# Patient Record
Sex: FEMALE | Race: WHITE | Employment: UNEMPLOYED | URBAN - METROPOLITAN AREA
[De-identification: names, ages, dates, MRNs, and addresses within clinical notes are randomized per-mention and may not be internally consistent; named-entity substitution may affect disease eponyms.]

---

## 2017-04-10 ENCOUNTER — APPOINTMENT (OUTPATIENT)
Dept: GENERAL RADIOLOGY | Facility: CLINIC | Age: 57
End: 2017-04-10
Payer: COMMERCIAL

## 2017-04-10 ENCOUNTER — HOSPITAL ENCOUNTER (EMERGENCY)
Facility: CLINIC | Age: 57
Discharge: HOME | End: 2017-04-10
Attending: EMERGENCY MEDICINE
Payer: COMMERCIAL

## 2017-04-10 VITALS
RESPIRATION RATE: 18 BRPM | SYSTOLIC BLOOD PRESSURE: 158 MMHG | HEART RATE: 85 BPM | OXYGEN SATURATION: 98 % | HEIGHT: 64 IN | TEMPERATURE: 98.1 F | DIASTOLIC BLOOD PRESSURE: 108 MMHG | BODY MASS INDEX: 23.22 KG/M2 | WEIGHT: 136 LBS

## 2017-04-10 DIAGNOSIS — R53.83 OTHER FATIGUE: ICD-10-CM

## 2017-04-10 DIAGNOSIS — R05.9 COUGH: Primary | ICD-10-CM

## 2017-04-10 LAB
ABSOLUTE EOS #: 0.1 K/UL (ref 0–0.4)
ABSOLUTE LYMPH #: 1.1 K/UL (ref 1–4.8)
ABSOLUTE MONO #: 0.5 K/UL (ref 0.1–1.2)
ALBUMIN SERPL-MCNC: 3.9 G/DL (ref 3.5–5.2)
ALBUMIN/GLOBULIN RATIO: 1.3 (ref 1–2.5)
ALP BLD-CCNC: 79 U/L (ref 35–104)
ALT SERPL-CCNC: 24 U/L (ref 5–33)
ANION GAP SERPL CALCULATED.3IONS-SCNC: 14 MMOL/L (ref 9–17)
AST SERPL-CCNC: 36 U/L
BASOPHILS # BLD: 0 % (ref 0–2)
BASOPHILS ABSOLUTE: 0 K/UL (ref 0–0.2)
BILIRUB SERPL-MCNC: 0.3 MG/DL (ref 0.3–1.2)
BUN BLDV-MCNC: 11 MG/DL (ref 6–20)
BUN/CREAT BLD: ABNORMAL (ref 9–20)
CALCIUM SERPL-MCNC: 9.4 MG/DL (ref 8.6–10.4)
CHLORIDE BLD-SCNC: 103 MMOL/L (ref 98–107)
CO2: 23 MMOL/L (ref 20–31)
CREAT SERPL-MCNC: 0.5 MG/DL (ref 0.5–0.9)
D-DIMER QUANTITATIVE: 0.44 MG/L FEU
DIFFERENTIAL TYPE: ABNORMAL
EOSINOPHILS RELATIVE PERCENT: 2 % (ref 1–4)
GFR AFRICAN AMERICAN: >60 ML/MIN
GFR NON-AFRICAN AMERICAN: >60 ML/MIN
GFR SERPL CREATININE-BSD FRML MDRD: ABNORMAL ML/MIN/{1.73_M2}
GFR SERPL CREATININE-BSD FRML MDRD: ABNORMAL ML/MIN/{1.73_M2}
GLUCOSE BLD-MCNC: 164 MG/DL (ref 70–99)
HCT VFR BLD CALC: 41.5 % (ref 36–46)
HEMOGLOBIN: 14.2 G/DL (ref 12–16)
LYMPHOCYTES # BLD: 23 % (ref 24–44)
MCH RBC QN AUTO: 34.5 PG (ref 26–34)
MCHC RBC AUTO-ENTMCNC: 34.2 G/DL (ref 31–37)
MCV RBC AUTO: 100.9 FL (ref 80–100)
MONOCYTES # BLD: 10 % (ref 2–11)
PDW BLD-RTO: 12.1 % (ref 12.5–15.4)
PLATELET # BLD: 273 K/UL (ref 140–450)
PLATELET ESTIMATE: ABNORMAL
PMV BLD AUTO: 7.8 FL (ref 6–12)
POTASSIUM SERPL-SCNC: 3.9 MMOL/L (ref 3.7–5.3)
RBC # BLD: 4.11 M/UL (ref 4–5.2)
RBC # BLD: ABNORMAL 10*6/UL
SEG NEUTROPHILS: 65 % (ref 36–66)
SEGMENTED NEUTROPHILS ABSOLUTE COUNT: 3.2 K/UL (ref 1.8–7.7)
SODIUM BLD-SCNC: 140 MMOL/L (ref 135–144)
TOTAL PROTEIN: 6.9 G/DL (ref 6.4–8.3)
TROPONIN INTERP: NORMAL
TROPONIN T: <0.03 NG/ML
TSH SERPL DL<=0.05 MIU/L-ACNC: 1.37 MIU/L (ref 0.3–5)
WBC # BLD: 4.9 K/UL (ref 3.5–11)
WBC # BLD: ABNORMAL 10*3/UL

## 2017-04-10 PROCEDURE — 36415 COLL VENOUS BLD VENIPUNCTURE: CPT

## 2017-04-10 PROCEDURE — 93005 ELECTROCARDIOGRAM TRACING: CPT

## 2017-04-10 PROCEDURE — 85379 FIBRIN DEGRADATION QUANT: CPT

## 2017-04-10 PROCEDURE — 84443 ASSAY THYROID STIM HORMONE: CPT

## 2017-04-10 PROCEDURE — 85025 COMPLETE CBC W/AUTO DIFF WBC: CPT

## 2017-04-10 PROCEDURE — 71020 XR CHEST STANDARD TWO VW: CPT

## 2017-04-10 PROCEDURE — 80053 COMPREHEN METABOLIC PANEL: CPT

## 2017-04-10 PROCEDURE — 99284 EMERGENCY DEPT VISIT MOD MDM: CPT

## 2017-04-10 PROCEDURE — 84484 ASSAY OF TROPONIN QUANT: CPT

## 2017-04-10 RX ORDER — BENZONATATE 200 MG/1
200 CAPSULE ORAL 3 TIMES DAILY PRN
Qty: 30 CAPSULE | Refills: 0 | Status: SHIPPED | OUTPATIENT
Start: 2017-04-10 | End: 2017-04-17

## 2017-04-10 ASSESSMENT — ENCOUNTER SYMPTOMS
BLOOD IN STOOL: 0
COUGH: 1
ABDOMINAL PAIN: 0
EYE PAIN: 0
SHORTNESS OF BREATH: 0
WHEEZING: 1
BACK PAIN: 0
NAUSEA: 0
DIARRHEA: 0
CONSTIPATION: 0
VOMITING: 0

## 2017-04-11 LAB
EKG ATRIAL RATE: 80 BPM
EKG P AXIS: 62 DEGREES
EKG P-R INTERVAL: 114 MS
EKG Q-T INTERVAL: 390 MS
EKG QRS DURATION: 88 MS
EKG QTC CALCULATION (BAZETT): 449 MS
EKG R AXIS: 57 DEGREES
EKG T AXIS: 25 DEGREES
EKG VENTRICULAR RATE: 80 BPM

## 2017-06-20 ENCOUNTER — HOSPITAL ENCOUNTER (OUTPATIENT)
Dept: MAMMOGRAPHY | Age: 57
Discharge: HOME | End: 2017-06-20
Payer: COMMERCIAL

## 2017-06-20 DIAGNOSIS — Z12.31 VISIT FOR SCREENING MAMMOGRAM: ICD-10-CM

## 2017-06-20 PROCEDURE — 77063 BREAST TOMOSYNTHESIS BI: CPT

## 2018-01-24 ENCOUNTER — HOSPITAL ENCOUNTER (OUTPATIENT)
Age: 58
Setting detail: SPECIMEN
Discharge: HOME | End: 2018-01-24
Payer: COMMERCIAL

## 2018-01-24 ENCOUNTER — HOSPITAL ENCOUNTER (OUTPATIENT)
Age: 58
Discharge: HOME | End: 2018-01-24
Payer: COMMERCIAL

## 2018-01-24 PROCEDURE — 88305 TISSUE EXAM BY PATHOLOGIST: CPT

## 2018-02-28 LAB
SURGICAL PATHOLOGY REPORT: NORMAL
SURGICAL PATHOLOGY REPORT: NORMAL

## 2018-07-09 ENCOUNTER — HOSPITAL ENCOUNTER (OUTPATIENT)
Dept: MAMMOGRAPHY | Age: 58
Discharge: HOME | End: 2018-07-11
Payer: COMMERCIAL

## 2018-07-09 DIAGNOSIS — Z12.31 ENCOUNTER FOR SCREENING MAMMOGRAM FOR BREAST CANCER: ICD-10-CM

## 2018-07-09 PROCEDURE — 77063 BREAST TOMOSYNTHESIS BI: CPT

## 2019-07-23 ENCOUNTER — HOSPITAL ENCOUNTER (OUTPATIENT)
Dept: MAMMOGRAPHY | Age: 59
Discharge: HOME | End: 2019-07-25
Payer: COMMERCIAL

## 2019-07-23 DIAGNOSIS — Z12.39 SCREENING FOR BREAST CANCER: ICD-10-CM

## 2019-07-23 PROCEDURE — 77063 BREAST TOMOSYNTHESIS BI: CPT

## 2019-08-21 ENCOUNTER — TELEPHONE (OUTPATIENT)
Dept: ONCOLOGY | Age: 59
End: 2019-08-21

## 2019-08-28 ENCOUNTER — HOSPITAL ENCOUNTER (EMERGENCY)
Facility: CLINIC | Age: 59
Discharge: HOME | End: 2019-08-28
Attending: EMERGENCY MEDICINE
Payer: COMMERCIAL

## 2019-08-28 VITALS
HEIGHT: 64 IN | RESPIRATION RATE: 16 BRPM | OXYGEN SATURATION: 96 % | SYSTOLIC BLOOD PRESSURE: 150 MMHG | BODY MASS INDEX: 24.75 KG/M2 | WEIGHT: 145 LBS | TEMPERATURE: 98.3 F | DIASTOLIC BLOOD PRESSURE: 93 MMHG | HEART RATE: 86 BPM

## 2019-08-28 DIAGNOSIS — T63.481A INSECT STINGS, ACCIDENTAL OR UNINTENTIONAL, INITIAL ENCOUNTER: Primary | ICD-10-CM

## 2019-08-28 PROCEDURE — 99281 EMR DPT VST MAYX REQ PHY/QHP: CPT

## 2019-08-28 RX ORDER — PREDNISONE 10 MG/1
TABLET ORAL
Qty: 20 TABLET | Refills: 0 | Status: SHIPPED | OUTPATIENT
Start: 2019-08-28 | End: 2019-09-07

## 2019-08-28 RX ORDER — DIPHENHYDRAMINE HCL 25 MG
50 CAPSULE ORAL EVERY 6 HOURS PRN
Qty: 30 CAPSULE | Refills: 0 | Status: SHIPPED | OUTPATIENT
Start: 2019-08-28 | End: 2022-04-06

## 2019-08-28 ASSESSMENT — PAIN DESCRIPTION - PAIN TYPE: TYPE: ACUTE PAIN

## 2019-08-28 ASSESSMENT — PAIN DESCRIPTION - PROGRESSION: CLINICAL_PROGRESSION: GRADUALLY WORSENING

## 2019-08-28 ASSESSMENT — PAIN DESCRIPTION - LOCATION: LOCATION: LEG

## 2019-08-28 ASSESSMENT — PAIN DESCRIPTION - ONSET: ONSET: SUDDEN

## 2019-08-28 ASSESSMENT — PAIN SCALES - GENERAL: PAINLEVEL_OUTOF10: 5

## 2019-08-28 ASSESSMENT — PAIN DESCRIPTION - DESCRIPTORS: DESCRIPTORS: ACHING

## 2019-08-28 ASSESSMENT — PAIN DESCRIPTION - ORIENTATION: ORIENTATION: LEFT;UPPER

## 2019-08-28 ASSESSMENT — PAIN DESCRIPTION - FREQUENCY: FREQUENCY: INTERMITTENT

## 2019-08-28 NOTE — ED PROVIDER NOTES
[facility]  eMERGENCY dEPARTMENT eNCOUnter      Pt Name: Yong Thomas  MRN: [id number]  Geremiasgfsabrina 1960  Date of evaluation: 8/28/2019      CHIEF COMPLAINT       Chief Complaint   Patient presents with    Insect Bite     stung last night by wasp, localized edema and and redness to L upper leg, denies any trouble breathing or swallowing, no at home Rx         HISTORY OF PRESENT ILLNESS      The patient presents with a insect sting on her left thigh. She was stung last night on the proximal thigh. She now has redness and warmth over the whole top of the thigh. The patient has no hives or difficulty breathing. There is no rash elsewhere. It is rather itchy. She tried some ibuprofen but nothing else for it. Nothing makes her symptoms better or worse otherwise. REVIEW OF SYSTEMS       All systems reviewed and negative unless noted in HPI. The patient denies fever or constitutional symptoms. Denies vision change. Denies any sore throat or rhinorrhea. Denies any neck pain or stiffness. Denies chest pain or shortness of breath. No nausea,  vomiting or diarrhea. Denies any dysuria. Denies urinary frequency or hematuria. Denies musculoskeletal injury or pain. Denies any weakness, numbness or focal neurologic deficit. Left thigh redness and warmth after sting. No recent psychiatric issues. No easy bruising or bleeding. Denies any polyuria, polydypsia or history of immunocompromise. PAST MEDICAL HISTORY    has a past medical history of Depression and History of abnormal Pap smear. SURGICAL HISTORY      has a past surgical history that includes Colposcopy (2005) and Hand surgery (2013). CURRENT MEDICATIONS       Previous Medications    NORETHINDRONE (FERNANDA-BE) 0.35 MG TABLET    Take 1 tablet by mouth daily. SERTRALINE (ZOLOFT) 100 MG TABLET    TAKE ONE TABLET BY MOUTH ONE TIME DAILY       ALLERGIES     has No Known Allergies.     FAMILY Localized reaction to hymenoptera sting, allergic reaction    DIAGNOSTIC RESULTS         EMERGENCY DEPARTMENT COURSE:   Vitals:    Vitals:    08/28/19 1551   BP: (!) 150/93   Pulse: 86   Resp: 16   Temp: 98.3 °F (36.8 °C)   TempSrc: Oral   SpO2: 96%   Weight: 65.8 kg (145 lb)   Height: 5' 4\" (1.626 m)     -------------------------  BP: (!) 150/93, Temp: 98.3 °F (36.8 °C), Pulse: 86, Resp: 16      Re-evaluation Notes    I think this is a localized reaction. I will write for any histamines and steroids. The patient is discharged in good condition. She knows to return for difficulty breathing or hives. FINAL IMPRESSION      1.  Insect stings, accidental or unintentional, initial encounter          DISPOSITION/PLAN   DISPOSITION Decision To Discharge 08/28/2019 04:30:23 PM      Condition on Disposition    good    PATIENT REFERRED TO:  Merced Andrew MD  [address] [place] [address]  [phone]    In 1 week        DISCHARGE MEDICATIONS:  New Prescriptions    DIPHENHYDRAMINE (BENADRYL) 25 MG CAPSULE    Take 2 capsules by mouth every 6 hours as needed for Itching    PREDNISONE (DELTASONE) 10 MG TABLET    Take 4 tablets by mouth once daily for 5 days       (Please note that portions of this note were completed with a voice recognition program.  Efforts were made to edit the dictations but occasionally words are mis-transcribed.)    Saucedo MD   Attending Emergency Physician       Kevin Rubalcava MD  08/28/19 [id number]

## 2020-09-08 ENCOUNTER — HOSPITAL ENCOUNTER (OUTPATIENT)
Dept: MAMMOGRAPHY | Age: 60
Discharge: HOME | End: 2020-09-10
Payer: COMMERCIAL

## 2020-09-08 PROCEDURE — 77063 BREAST TOMOSYNTHESIS BI: CPT

## 2021-09-14 ENCOUNTER — HOSPITAL ENCOUNTER (OUTPATIENT)
Dept: MAMMOGRAPHY | Age: 61
Discharge: HOME | End: 2021-09-16
Payer: COMMERCIAL

## 2021-09-14 DIAGNOSIS — Z12.31 ENCOUNTER FOR SCREENING MAMMOGRAM FOR BREAST CANCER: ICD-10-CM

## 2021-09-14 PROCEDURE — 77063 BREAST TOMOSYNTHESIS BI: CPT

## 2022-04-05 ASSESSMENT — ENCOUNTER SYMPTOMS
COLOR CHANGE: 0
DIARRHEA: 0
SINUS PRESSURE: 0
CONSTIPATION: 0
CHEST TIGHTNESS: 0
ABDOMINAL PAIN: 0
SHORTNESS OF BREATH: 0
SINUS PAIN: 0

## 2022-04-05 NOTE — PROGRESS NOTES
Chelly Amin is a 62 y.o. female who presents in office today with Self establish new care with office. Previous PCP was Dr Travis Cervantes at [facility]   Patient presents with   Ai Gupta New Patient     establish care        History of Present Illness:     HPI    Here to establish care. Overall feeling well. Doing well on current medications. Quit smoking in 2017 after having pneumonia. Was informed previously of diagnosis of COPD however unclear if she actually has this. Suspect she had COVID in 2022 while in [place]. She was unable to obtain Covid test until 8 days later so was not tested. She does have albuterol as needed and she has been using once every few days for coughing. Reports she had spirometry and lung CT . Carpal tunnel in both hands. Going for first cortisone injection tomorrow with Dr. Norma Cuello at [facility]. Had EMG done at Dr Raciel Varela, neurology, confirming carpal tunnel. Reports has had multiple liver US showing fatty liver. Last scan about 6 months ago. Had elevated liver enzymes which she relates to drinking. She does admit to drinking vodka most days. Had blood work 2021. Fibroscan in  showing moderate fibrosis. She has appointment scheduled with Dr. Oneil Saxena at [facility] at the end of May. She plans to address questions about her liver during this appointment. Depression controlled on venlafaxine 37.5 mg daily.        Care gaps: COVID-19 vaccine:   J&J x1, 2021  Colon CA screenin, repeat in 5 years, Dr Merlin Ruiz:   Hepatitis C/HIV screens:     Breast CA screenin/14/21 negative, family hx breast CA and increased lifetime risk  OB/GYN, cervical CA screening:   Due   Low dose CT:   [facility] Maintenance Due   Topic Date Due    Hepatitis C screen  Never done    COVID-19 Vaccine (1) Never done    Depression Screen  Never done    HIV screen  Never done    DTaP/Tdap/Td vaccine (1 - Tdap) Never done    Lipid screen  Never done    Colorectal Cancer Screen  Never done    Shingles Vaccine (1 of 2) Never done    Low dose CT lung screening  Never done    Cervical cancer screen  04/29/2017        Patient Care Team:  STEPHY Gomez - CNP as PCP - General (Nurse Practitioner)    Reviewed     [x] Past Medical, Family, and Social History was reviewed per writer and does contribute to the patient presenting condition. [x] Laboratory Results, Vital signs, Imaging, Active Problems, Immunizations, Current/Recently Discontinued Medications, Health Maintenance Activities Due, Referral Notes (if available) were reviewed per writer     [x] Reviewed Depression screening if taken or valid today or any other valid screening tool (others seen below) Interpretation of Total Score DepressionSeverity: 1-4 = Minimal depression, 5-9 = Mild depression, 10-14 = Moderate depression, 15-19 = Moderately severe depression, 20-27 =Severe depression    PHQ Scores 4/6/2022   PHQ2 Score 4   PHQ9 Score 9     Interpretation of Total Score Depression Severity: 1-4 = Minimal depression, 5-9 = Mild depression, 10-14 = Moderate depression, 15-19 = Moderately severe depression, 20-27 = Severe depression     Review of Systems (Subjective)     Review of Systems   Constitutional: Negative for activity change, appetite change and unexpected weight change. HENT: Negative for congestion, sinus pressure and sinus pain. Respiratory: Negative for chest tightness and shortness of breath. Cardiovascular: Negative for chest pain, palpitations and leg swelling. Gastrointestinal: Negative for abdominal pain, constipation and diarrhea. Genitourinary: Negative for difficulty urinating and dysuria. Musculoskeletal: Positive for arthralgias (bilateral wrists). Negative for myalgias. Skin: Negative for color change and rash. Neurological: Negative for dizziness, numbness and headaches.    Psychiatric/Behavioral: Negative for dysphoric mood and sleep disturbance. The patient is not nervous/anxious. See HPI     Physical Assessment (Objective)     /85 (Site: Left Upper Arm, Position: Sitting, Cuff Size: Small Adult)   Pulse 101   Temp 97.2 °F (36.2 °C) (Temporal)   Resp 16   Ht 5' 4\" (1.626 m)   Wt 143 lb (64.9 kg)   SpO2 95%   BMI 24.55 kg/m²      Physical Exam  Vitals reviewed. HENT:      Head: Normocephalic and atraumatic. Right Ear: External ear normal.      Left Ear: External ear normal.      Nose: Nose normal.      Mouth/Throat:      Mouth: Mucous membranes are moist.      Pharynx: Oropharynx is clear. Eyes:      Extraocular Movements: Extraocular movements intact. Conjunctiva/sclera: Conjunctivae normal.      Pupils: Pupils are equal, round, and reactive to light. Cardiovascular:      Rate and Rhythm: Normal rate and regular rhythm. Pulses: Normal pulses. Heart sounds: Normal heart sounds. No murmur heard. Pulmonary:      Effort: Pulmonary effort is normal. No respiratory distress. Breath sounds: Normal breath sounds. Abdominal:      General: Bowel sounds are normal.      Palpations: Abdomen is soft. Musculoskeletal:         General: No swelling. Normal range of motion. Cervical back: Normal range of motion and neck supple. Skin:     General: Skin is warm and dry. Capillary Refill: Capillary refill takes less than 2 seconds. Neurological:      Mental Status: She is alert and oriented to person, place, and time. Motor: No weakness. Gait: Gait normal.   Psychiatric:         Mood and Affect: Mood normal.         Behavior: Behavior normal.         Thought Content: Thought content normal.         Judgment: Judgment normal.         Diagnoses / Plan:   1. Encounter to establish care  2. Primary hypertension  3. Other hyperlipidemia     Follow up with specialist as scheduled. Obtain labs and scans from [facility].    Declines need for refills on medications at this time. Understanding and agreement was voiced with all above plans. All questions answered to satisfaction. Encouraged healthy diet and exercise. Call office with any questions or new or worsening symptoms or concerns. Return in about 3 months (around 7/6/2022) for chronic conditions, discuss labs. Electronically signed by STEPHY Barksdale CNP on 4/6/2022 at 4:37 PM    Note is dictated utilizing voice recognition software. Unfortunately this leads to occasional typographical errors. Please contact our office if you have any questions.

## 2022-04-06 ENCOUNTER — OFFICE VISIT (OUTPATIENT)
Dept: FAMILY MEDICINE CLINIC | Age: 62
End: 2022-04-06
Payer: COMMERCIAL

## 2022-04-06 VITALS
HEART RATE: 101 BPM | RESPIRATION RATE: 16 BRPM | BODY MASS INDEX: 24.41 KG/M2 | WEIGHT: 143 LBS | HEIGHT: 64 IN | SYSTOLIC BLOOD PRESSURE: 124 MMHG | TEMPERATURE: 97.2 F | OXYGEN SATURATION: 95 % | DIASTOLIC BLOOD PRESSURE: 85 MMHG

## 2022-04-06 DIAGNOSIS — I10 PRIMARY HYPERTENSION: ICD-10-CM

## 2022-04-06 DIAGNOSIS — Z76.89 ENCOUNTER TO ESTABLISH CARE: Primary | ICD-10-CM

## 2022-04-06 DIAGNOSIS — E78.49 OTHER HYPERLIPIDEMIA: ICD-10-CM

## 2022-04-06 PROBLEM — K76.0 FATTY LIVER: Status: ACTIVE | Noted: 2022-04-06

## 2022-04-06 PROCEDURE — 99203 OFFICE O/P NEW LOW 30 MIN: CPT | Performed by: NURSE PRACTITIONER

## 2022-04-06 RX ORDER — VENLAFAXINE 37.5 MG/1
37.5 TABLET ORAL 3 TIMES DAILY
COMMUNITY
End: 2022-06-27 | Stop reason: SDUPTHER

## 2022-04-06 RX ORDER — LISINOPRIL 10 MG/1
TABLET ORAL
COMMUNITY
Start: 2022-01-13 | End: 2022-06-27 | Stop reason: SDUPTHER

## 2022-04-06 RX ORDER — ATORVASTATIN CALCIUM 20 MG/1
TABLET, FILM COATED ORAL
COMMUNITY
Start: 2022-01-13 | End: 2022-06-27 | Stop reason: SDUPTHER

## 2022-04-06 RX ORDER — HYDROCHLOROTHIAZIDE 25 MG/1
TABLET ORAL
COMMUNITY
Start: 2022-01-31 | End: 2022-06-27 | Stop reason: SDUPTHER

## 2022-04-06 RX ORDER — ALBUTEROL SULFATE 90 UG/1
AEROSOL, METERED RESPIRATORY (INHALATION)
COMMUNITY
Start: 2022-04-03

## 2022-04-06 SDOH — ECONOMIC STABILITY: FOOD INSECURITY: WITHIN THE PAST 12 MONTHS, YOU WORRIED THAT YOUR FOOD WOULD RUN OUT BEFORE YOU GOT MONEY TO BUY MORE.: NEVER TRUE

## 2022-04-06 SDOH — ECONOMIC STABILITY: FOOD INSECURITY: WITHIN THE PAST 12 MONTHS, THE FOOD YOU BOUGHT JUST DIDN'T LAST AND YOU DIDN'T HAVE MONEY TO GET MORE.: NEVER TRUE

## 2022-04-06 ASSESSMENT — PATIENT HEALTH QUESTIONNAIRE - PHQ9
SUM OF ALL RESPONSES TO PHQ QUESTIONS 1-9: 9
5. POOR APPETITE OR OVEREATING: 0
SUM OF ALL RESPONSES TO PHQ QUESTIONS 1-9: 9
2. FEELING DOWN, DEPRESSED OR HOPELESS: 2
7. TROUBLE CONCENTRATING ON THINGS, SUCH AS READING THE NEWSPAPER OR WATCHING TELEVISION: 0
SUM OF ALL RESPONSES TO PHQ QUESTIONS 1-9: 9
4. FEELING TIRED OR HAVING LITTLE ENERGY: 1
SUM OF ALL RESPONSES TO PHQ QUESTIONS 1-9: 9
9. THOUGHTS THAT YOU WOULD BE BETTER OFF DEAD, OR OF HURTING YOURSELF: 0
8. MOVING OR SPEAKING SO SLOWLY THAT OTHER PEOPLE COULD HAVE NOTICED. OR THE OPPOSITE, BEING SO FIGETY OR RESTLESS THAT YOU HAVE BEEN MOVING AROUND A LOT MORE THAN USUAL: 0
1. LITTLE INTEREST OR PLEASURE IN DOING THINGS: 2
3. TROUBLE FALLING OR STAYING ASLEEP: 3
10. IF YOU CHECKED OFF ANY PROBLEMS, HOW DIFFICULT HAVE THESE PROBLEMS MADE IT FOR YOU TO DO YOUR WORK, TAKE CARE OF THINGS AT HOME, OR GET ALONG WITH OTHER PEOPLE: 0
6. FEELING BAD ABOUT YOURSELF - OR THAT YOU ARE A FAILURE OR HAVE LET YOURSELF OR YOUR FAMILY DOWN: 1
SUM OF ALL RESPONSES TO PHQ9 QUESTIONS 1 & 2: 4

## 2022-04-06 ASSESSMENT — SOCIAL DETERMINANTS OF HEALTH (SDOH): HOW HARD IS IT FOR YOU TO PAY FOR THE VERY BASICS LIKE FOOD, HOUSING, MEDICAL CARE, AND HEATING?: NOT HARD AT ALL

## 2022-06-06 LAB — GLUCOSE BLD-MCNC: 97 MG/DL (ref 70–100)

## 2022-06-27 ENCOUNTER — HOSPITAL ENCOUNTER (OUTPATIENT)
Age: 62
Setting detail: SPECIMEN
Discharge: HOME | End: 2022-06-27

## 2022-06-27 ENCOUNTER — OFFICE VISIT (OUTPATIENT)
Dept: FAMILY MEDICINE CLINIC | Age: 62
End: 2022-06-27
Payer: COMMERCIAL

## 2022-06-27 VITALS
SYSTOLIC BLOOD PRESSURE: 143 MMHG | HEIGHT: 64 IN | DIASTOLIC BLOOD PRESSURE: 95 MMHG | HEART RATE: 94 BPM | BODY MASS INDEX: 24.59 KG/M2 | WEIGHT: 144 LBS

## 2022-06-27 DIAGNOSIS — J44.9 CHRONIC OBSTRUCTIVE PULMONARY DISEASE, UNSPECIFIED COPD TYPE (HCC): ICD-10-CM

## 2022-06-27 DIAGNOSIS — Z87.891 PERSONAL HISTORY OF TOBACCO USE: ICD-10-CM

## 2022-06-27 DIAGNOSIS — R73.09 ELEVATED GLUCOSE: ICD-10-CM

## 2022-06-27 DIAGNOSIS — Z12.31 ENCOUNTER FOR SCREENING MAMMOGRAM FOR MALIGNANT NEOPLASM OF BREAST: ICD-10-CM

## 2022-06-27 DIAGNOSIS — F32.A DEPRESSION, UNSPECIFIED DEPRESSION TYPE: ICD-10-CM

## 2022-06-27 DIAGNOSIS — I10 PRIMARY HYPERTENSION: ICD-10-CM

## 2022-06-27 DIAGNOSIS — E78.49 OTHER HYPERLIPIDEMIA: ICD-10-CM

## 2022-06-27 DIAGNOSIS — Z91.89 AT HIGH RISK FOR BREAST CANCER: ICD-10-CM

## 2022-06-27 DIAGNOSIS — K76.0 FATTY LIVER: Primary | ICD-10-CM

## 2022-06-27 DIAGNOSIS — K76.0 NAFLD (NONALCOHOLIC FATTY LIVER DISEASE): ICD-10-CM

## 2022-06-27 DIAGNOSIS — K76.0 FATTY LIVER: ICD-10-CM

## 2022-06-27 PROCEDURE — G0296 VISIT TO DETERM LDCT ELIG: HCPCS | Performed by: NURSE PRACTITIONER

## 2022-06-27 PROCEDURE — 99214 OFFICE O/P EST MOD 30 MIN: CPT | Performed by: NURSE PRACTITIONER

## 2022-06-27 RX ORDER — VENLAFAXINE 37.5 MG/1
37.5 TABLET ORAL DAILY
Qty: 90 TABLET | Refills: 1 | Status: SHIPPED | OUTPATIENT
Start: 2022-06-27

## 2022-06-27 RX ORDER — HYDROCHLOROTHIAZIDE 25 MG/1
25 TABLET ORAL DAILY
Qty: 90 TABLET | Refills: 1 | Status: SHIPPED | OUTPATIENT
Start: 2022-06-27

## 2022-06-27 RX ORDER — VENLAFAXINE 37.5 MG/1
37.5 TABLET ORAL 3 TIMES DAILY
Qty: 90 TABLET | Refills: 3 | Status: SHIPPED | OUTPATIENT
Start: 2022-06-27 | End: 2022-06-27

## 2022-06-27 RX ORDER — LISINOPRIL 10 MG/1
10 TABLET ORAL DAILY
Qty: 90 TABLET | Refills: 1 | Status: SHIPPED | OUTPATIENT
Start: 2022-06-27

## 2022-06-27 RX ORDER — ATORVASTATIN CALCIUM 20 MG/1
20 TABLET, FILM COATED ORAL DAILY
Qty: 90 TABLET | Refills: 1 | Status: SHIPPED | OUTPATIENT
Start: 2022-06-27

## 2022-06-27 ASSESSMENT — PATIENT HEALTH QUESTIONNAIRE - PHQ9
1. LITTLE INTEREST OR PLEASURE IN DOING THINGS: 0
SUM OF ALL RESPONSES TO PHQ QUESTIONS 1-9: 1
SUM OF ALL RESPONSES TO PHQ9 QUESTIONS 1 & 2: 1
2. FEELING DOWN, DEPRESSED OR HOPELESS: 1

## 2022-06-27 NOTE — PROGRESS NOTES
Polly Pradhan is a 62 y.o. female who presents in office today with Self   follow up on chronic conditions including:   Patient Active Problem List   Diagnosis    History of abnormal Pap smear    Primary hypertension    Other hyperlipidemia    NAFLD (nonalcoholic fatty liver disease)    Chronic obstructive pulmonary disease ([facility] [address]    At high risk for breast cancer    Personal history of tobacco use       Chief Complaint   Patient presents with    Results     discuss labs    Hand Pain     had surgery on hand 22 still numb. Told it was carpal tunnel, but thinks it may be her ulner nerve    Medication Refill     would like all meds switched to 90 day supply to [facility]        History of Present Illness:     HPI    Here for follow up. Had appointment with GI Dr Loraine Page on 22. Repeat colonoscopy recommended in  and for NAFLD, low carbohydrate diet and exercise. Had carpal tunnel surgery  Dr Susy Galvan at [facility]. Concerned for left ulnar nerve pain and forearm soreness. Due for follow up with surgeon but has not yet scheduled. She does plan to make appointment, may request repeat EMG as surgery has not improved symptoms. Reports albuterol not always helpful, especially in the morning when she has increased amounts of mucus. Requesting order for mammogram. Due for breast MRI due to high risk status.      Care gaps: COVID-19 vaccine:   J&J x1, 2021  Colon CA screenin, repeat in 5 years, Dr Irving Spicer:   Hepatitis C/HIV screens:   discussed - low risk  Breast CA screenin/14/21 negative, family hx breast CA and increased lifetime risk  OB/GYN, cervical CA screening:   Due - last 2018  Low dose CT:  2021 RADS-2, ordered for 2022  DEXA: 2020 normal    Health Maintenance Due   Topic Date Due    Lipids  Never done    HIV screen  Never done    Hepatitis C screen  Never done    DTaP/Tdap/Td vaccine (1 - Tdap) Never done   Saadia Coreas Colorectal Cancer Screen  Never done    Shingles vaccine (1 of 2) Never done    Low dose CT lung screening  Never done    Cervical cancer screen  04/29/2017        Patient Care Team:  STEPHY Page CNP as PCP - General (Nurse Practitioner)  STEPHY Page CNP as PCP - [facility] Empaneled Provider    Reviewed     [x] Past Medical, Family, and Social History was reviewed per writer and does contribute to the patient presenting condition. [x] Laboratory Results, Vital signs, Imaging, Active Problems, Immunizations, Current/Recently Discontinued Medications, Health Maintenance Activities Due, Referral Notes (if available) were reviewed per writer     [x] Reviewed Depression screening if taken or valid today or any other valid screening tool (others seen below) Interpretation of Total Score DepressionSeverity: 1-4 = Minimal depression, 5-9 = Mild depression, 10-14 = Moderate depression, 15-19 = Moderately severe depression, 20-27 =Severe depression    PHQ Scores 6/27/2022 4/6/2022   PHQ2 Score 1 4   PHQ9 Score 1 9     Interpretation of Total Score Depression Severity: 1-4 = Minimal depression, 5-9 = Mild depression, 10-14 = Moderate depression, 15-19 = Moderately severe depression, 20-27 = Severe depression     Review of Systems (Subjective)   Review of Systems  Constitutional: Negative for activity change, appetite change and unexpected weight change. HENT: Negative for congestion, sinus pressure and sinus pain. Respiratory: Negative for chest tightness and shortness of breath. Cardiovascular: Negative for chest pain, palpitations and leg swelling. Gastrointestinal: Negative for abdominal pain, constipation and diarrhea. Genitourinary: Negative for difficulty urinating and dysuria. Musculoskeletal: Positive for arthralgias (bilateral wrists). Negative for myalgias. Skin: Negative for color change and rash. Neurological: Positive for numbness (left fingers).  Negative for dizziness and headaches. Psychiatric/Behavioral: Negative for dysphoric mood and sleep disturbance. The patient is not nervous/anxious. See HPI     Physical Assessment (Objective)     BP (!) 143/95 (Site: Left Upper Arm, Position: Sitting, Cuff Size: Medium Adult) Comment: has not taken BP med in 2 days  Pulse 94   Ht 5' 4\" (1.626 m)   Wt 144 lb (65.3 kg)   BMI 24.72 kg/m²      Physical Exam  Vitals reviewed. Constitutional:       Appearance: Normal appearance. She is normal weight. HENT:      Head: Normocephalic and atraumatic. Right Ear: External ear normal.      Left Ear: External ear normal.      Nose: Nose normal.      Mouth/Throat:      Mouth: Mucous membranes are moist.      Pharynx: Oropharynx is clear. Eyes:      Extraocular Movements: Extraocular movements intact. Conjunctiva/sclera: Conjunctivae normal.      Pupils: Pupils are equal, round, and reactive to light. Cardiovascular:      Rate and Rhythm: Normal rate and regular rhythm. Pulses: Normal pulses. Heart sounds: Normal heart sounds. No murmur heard. Pulmonary:      Effort: Pulmonary effort is normal. No respiratory distress. Breath sounds: Normal breath sounds. Abdominal:      General: Bowel sounds are normal.      Palpations: Abdomen is soft. Musculoskeletal:         General: No swelling. Normal range of motion. Left forearm: Tenderness present. No swelling, edema, deformity or bony tenderness. Left wrist: No swelling. Normal range of motion. Normal pulse. Cervical back: Normal range of motion and neck supple. Skin:     General: Skin is warm and dry. Capillary Refill: Capillary refill takes less than 2 seconds. Neurological:      Mental Status: She is alert and oriented to person, place, and time. Motor: No weakness.       Gait: Gait normal.   Psychiatric:         Attention and Perception: Attention normal.         Mood and Affect: Mood normal.         Behavior: Behavior normal. Thought Content: Thought content normal.         Judgment: Judgment normal.         Diagnoses / Plan:   1. Fatty liver  -     Lipid Panel; Future  2. NAFLD (nonalcoholic fatty liver disease)  3. Chronic obstructive pulmonary disease, unspecified COPD type (HCC)  -     tiotropium (SPIRIVA RESPIMAT) 2.5 MCG/ACT AERS inhaler; Inhale 2 puffs into the lungs daily, Disp-4 g, R-3Normal  -     tiotropium (SPIRIVA RESPIMAT) 2.5 MCG/ACT AERS inhaler; Inhale 2 puffs into the lungs daily, Disp-4 g, R-0Lot [id number] Exp Sep 2023Sample  4. Encounter for screening mammogram for malignant neoplasm of breast  -     CARMEN DIGITAL SCREEN W OR WO CAD BILATERAL; Future  -     MRI BREAST BILATERAL W WO CONTRAST; Future  5. Primary hypertension  -     lisinopril (PRINIVIL;ZESTRIL) 10 MG tablet; Take 1 tablet by mouth daily, Disp-90 tablet, R-1Normal  -     hydroCHLOROthiazide (HYDRODIURIL) 25 MG tablet; Take 1 tablet by mouth daily, Disp-90 tablet, R-1Normal  6. Other hyperlipidemia  -     Lipid Panel; Future  -     Hemoglobin A1C; Future  -     atorvastatin (LIPITOR) 20 MG tablet; Take 1 tablet by mouth daily, Disp-90 tablet, R-1Normal  7. Elevated glucose  -     Hemoglobin A1C; Future  8. Personal history of tobacco use  -     MI VISIT TO DISCUSS LUNG CA SCREEN W LDCT  -     CT Lung Screen (Annual); Future  9. At high risk for breast cancer  -     MRI BREAST BILATERAL W WO CONTRAST; Future  10. Depression, unspecified depression type  -     venlafaxine (EFFEXOR) 37.5 MG tablet; Take 1 tablet by mouth daily, Disp-90 tablet, R-1Normal     Continue current medications. [facility] records reviewed - pap due, CT lung screening not due until Sept 2022,   Understanding and agreement was voiced with all above plans. All questions answered to satisfaction. Encouraged healthy diet and exercise. Call office with any questions or new or worsening symptoms or concerns.      Return in about 6 months (around 12/27/2022) for discuss labs, Med Check, chronic conditions. Electronically signed by STEPHY Berry CNP on 6/27/2022 at 8:58 PM    Note is dictated utilizing voice recognition software. Unfortunately this leads to occasional typographical errors. Please contact our office if you have any questions. Low Dose CT (LDCT) Lung Screening criteria met:     Age 50-77(Medicare) or 50-80 ([facility])   Pack year smoking >20   Still smoking or less than 15 year since quit   No sign or symptoms of lung cancer   > 11 months since last LDCT     Risks and benefits of lung cancer screening with LDCT scans discussed:    Significance of positive screen - False-positive LDCT results often occur. 95% of all positive results do not lead to a diagnosis of cancer. Usually further imaging can resolve most false-positive results; however, some patients may require invasive procedures. Over diagnosis risk - 10% to 12% of screen-detected lung cancer cases are over diagnosed--that is, the cancer would not have been detected in the patient's lifetime without the screening. Need for follow up screens annually to continue lung cancer screening effectiveness     Risks associated with radiation from annual LDCT- Radiation exposure is about the same as for a mammogram, which is about 1/3 of the annual background radiation exposure from everyday life. Starting screening at age 54 is not likely to increase cancer risk from radiation exposure. Patients with comorbidities resulting in life expectancy of < 10 years, or that would preclude treatment of an abnormality identified on CT, should not be screened due to lack of benefit.     To obtain maximal benefit from this screening, smoking cessation and long-term abstinence from smoking is critical

## 2022-06-28 ENCOUNTER — PATIENT MESSAGE (OUTPATIENT)
Dept: FAMILY MEDICINE CLINIC | Age: 62
End: 2022-06-28

## 2022-06-28 DIAGNOSIS — R20.2 NUMBNESS AND TINGLING OF RIGHT ARM: Primary | ICD-10-CM

## 2022-06-28 DIAGNOSIS — Z98.890 HISTORY OF CARPAL TUNNEL RELEASE: ICD-10-CM

## 2022-06-28 DIAGNOSIS — R20.0 NUMBNESS AND TINGLING OF RIGHT ARM: Primary | ICD-10-CM

## 2022-06-28 LAB
CHOLESTEROL/HDL RATIO: 2.9
CHOLESTEROL: 255 MG/DL
ESTIMATED AVERAGE GLUCOSE: 120 MG/DL
HBA1C MFR BLD: 5.8 % (ref 4–6)
HDLC SERPL-MCNC: 89 MG/DL
LDL CHOLESTEROL: 132 MG/DL (ref 0–130)
TRIGL SERPL-MCNC: 169 MG/DL

## 2022-06-28 NOTE — TELEPHONE ENCOUNTER
From: Vitaly Romero  To: Chuy Montes  Sent: 6/28/2022 3:40 PM EDT  Subject: Follow up to my recent carpal tunnel surgery that did not work    Qianmi. Im so happy I found you and your practice. It probably makes sense for me to follow up with my surgeon Dr. Marcellus Babinski regarding the continual numbness in my hands. I think I would prefer to stick with a [facility] facility is my insurance company isnt too kind with [address] [place] [address]. I think I would like to have a second opinion with one of your neurologists. Prior to that, I think that I would like to have an EMG performed on my whole left arm. Please let me know your thoughts.  I have also scheduled all other testing that you have recommended [facility]

## 2022-07-11 ENCOUNTER — HOSPITAL ENCOUNTER (OUTPATIENT)
Dept: MRI IMAGING | Age: 62
Discharge: HOME | End: 2022-07-13
Payer: COMMERCIAL

## 2022-07-11 DIAGNOSIS — Z91.89 AT HIGH RISK FOR BREAST CANCER: ICD-10-CM

## 2022-07-11 DIAGNOSIS — Z12.31 ENCOUNTER FOR SCREENING MAMMOGRAM FOR MALIGNANT NEOPLASM OF BREAST: ICD-10-CM

## 2022-07-11 LAB
GFR NON-AFRICAN AMERICAN: >60 ML/MIN
GFR SERPL CREATININE-BSD FRML MDRD: >60 ML/MIN
GFR SERPL CREATININE-BSD FRML MDRD: NORMAL ML/MIN/{1.73_M2}
POC CREATININE: 0.75 MG/DL (ref 0.51–1.19)

## 2022-07-11 PROCEDURE — 6360000004 HC RX CONTRAST MEDICATION: Performed by: NURSE PRACTITIONER

## 2022-07-11 PROCEDURE — A9579 GAD-BASE MR CONTRAST NOS,1ML: HCPCS | Performed by: NURSE PRACTITIONER

## 2022-07-11 PROCEDURE — 2580000003 HC RX 258: Performed by: NURSE PRACTITIONER

## 2022-07-11 PROCEDURE — C8908 MRI W/O FOL W/CONT, BREAST,: HCPCS

## 2022-07-11 PROCEDURE — 82565 ASSAY OF CREATININE: CPT

## 2022-07-11 RX ORDER — SODIUM CHLORIDE 0.9 % (FLUSH) 0.9 %
10 SYRINGE (ML) INJECTION PRN
Status: DISCONTINUED | OUTPATIENT
Start: 2022-07-11 | End: 2022-07-14 | Stop reason: HOSPADM

## 2022-07-11 RX ORDER — 0.9 % SODIUM CHLORIDE 0.9 %
40 INTRAVENOUS SOLUTION INTRAVENOUS ONCE
Status: COMPLETED | OUTPATIENT
Start: 2022-07-11 | End: 2022-07-11

## 2022-07-11 RX ADMIN — GADOTERIDOL 15 ML: 279.3 INJECTION, SOLUTION INTRAVENOUS at 13:48

## 2022-07-11 RX ADMIN — SODIUM CHLORIDE 40 ML: 9 INJECTION, SOLUTION INTRAVENOUS at 13:49

## 2022-08-05 ENCOUNTER — OFFICE VISIT (OUTPATIENT)
Dept: NEUROSURGERY | Age: 62
End: 2022-08-05
Payer: COMMERCIAL

## 2022-08-05 VITALS
HEIGHT: 64 IN | OXYGEN SATURATION: 95 % | BODY MASS INDEX: 24.59 KG/M2 | DIASTOLIC BLOOD PRESSURE: 79 MMHG | WEIGHT: 144 LBS | HEART RATE: 115 BPM | SYSTOLIC BLOOD PRESSURE: 135 MMHG

## 2022-08-05 DIAGNOSIS — G56.03 BILATERAL CARPAL TUNNEL SYNDROME: ICD-10-CM

## 2022-08-05 DIAGNOSIS — R29.2 HYPERREFLEXIA: ICD-10-CM

## 2022-08-05 DIAGNOSIS — G95.9 MYELOPATHY (HCC): Primary | ICD-10-CM

## 2022-08-05 PROCEDURE — 99204 OFFICE O/P NEW MOD 45 MIN: CPT | Performed by: NURSE PRACTITIONER

## 2022-08-05 NOTE — PROGRESS NOTES
[address]  [facility] [place] [address], [address] [address]  Dept: [phone]    Patient:  Jermain Starkey  YOB: 1960  Date: 8/5/22    The patient is a 62 y.o. female who presents today for consult of the following problems:     Chief Complaint   Patient presents with    Numbness     New pt Numbness and tingling of right arm           HPI:     Jermain Starkey is a 62 y.o. female on whom neurosurgical consultation was requested by STEPHY Mathews CNP for management of numbness and tingling to bilateral hands. Patient reports that she has been struggling with diffuse numbness and tingling to both hands, sparing the thumbs for the last year. Has been evaluated by outside hand surgeons, did undergo EMG upper extremities that did show moderate bilateral carpal tunnel syndrome. Did undergo right carpal tunnel release more than a decade ago, and underwent left carpal tunnel release approximately 1 month ago. States that recent surgery has not had any effect on the numbness and tingling to her hands. Denies any associated pain. Denies any nocturnal symptoms. Does report that she has been experiencing progressive issues with unexpectedly dropping objects, states that an entire casserole just fell out of her hands last week. Reports that she has stopped writing checks because her handwriting is no longer legible. Has difficulty opening jars, unable to fasten her own jewelry. Denies any lower extremity numbness or tingling. No complaints of gait instability, notices that she holds on while going up or down stairs, but is not sure why she does this. Does endorse a single recent fall, this occurred while playing pickle ball, states that she went to move but her legs felt heavy and did not move with her causing her to fall.     History:     Past Medical History:   Diagnosis Date Depression     History of abnormal Pap smear     ASCUS (+)HPV     Past Surgical History:   Procedure Laterality Date    COLONOSCOPY      Dr Los Mobley  2005    HAND SURGERY  2013    right hand, trigger thumb     Family History   Problem Relation Age of Onset    Breast Cancer Mother     Cirrhosis Mother     Alcohol Abuse Mother     Hypertension Father     Breast Cancer Maternal Grandmother      Current Outpatient Medications on File Prior to Visit   Medication Sig Dispense Refill    tiotropium (SPIRIVA RESPIMAT) 2.5 MCG/ACT AERS inhaler Inhale 2 puffs into the lungs daily 4 g 3    tiotropium (SPIRIVA RESPIMAT) 2.5 MCG/ACT AERS inhaler Inhale 2 puffs into the lungs daily 4 g 0    lisinopril (PRINIVIL;ZESTRIL) 10 MG tablet Take 1 tablet by mouth daily 90 tablet 1    hydroCHLOROthiazide (HYDRODIURIL) 25 MG tablet Take 1 tablet by mouth daily 90 tablet 1    atorvastatin (LIPITOR) 20 MG tablet Take 1 tablet by mouth daily 90 tablet 1    venlafaxine (EFFEXOR) 37.5 MG tablet Take 1 tablet by mouth daily 90 tablet 1    albuterol sulfate  (90 Base) MCG/ACT inhaler INHALE 2 PUFFS BY MOUTH EVERY SIX HOURS AS NEEDED (Patient not taking: Reported on 2022)       No current facility-administered medications on file prior to visit. Social History     Tobacco Use    Smoking status: Former     Packs/day: 1.00     Years: 40.00     Pack years: 40.00     Types: Cigarettes     Start date: 1976     Quit date: 2017     Years since quittin.0    Smokeless tobacco: Never   Vaping Use    Vaping Use: Never used   Substance Use Topics    Alcohol use: Yes     Alcohol/week: 12.0 standard drinks     Types: 2 Glasses of wine, 10 Shots of liquor per week     Comment: Vodka 3 drinks per day    Drug use: No       No Known Allergies    Review of Systems  Constitutional: Negative for activity change and appetite change. HENT: Negative for ear pain and facial swelling.     Eyes: Negative for discharge and itching. Respiratory: Negative for choking and chest tightness. Cardiovascular: Negative for chest pain and leg swelling. Gastrointestinal: Negative for nausea and abdominal pain. Endocrine: Negative for cold intolerance and heat intolerance. Genitourinary: Negative for frequency and flank pain. Musculoskeletal: Negative for myalgias and joint swelling. Skin: Negative for rash and wound. Allergic/Immunologic: Negative for environmental allergies and food allergies. Hematological: Negative for adenopathy. Does not bruise/bleed easily. Psychiatric/Behavioral: Negative for self-injury. The patient is not nervous/anxious. Physical Exam:      /79   Pulse (!) 115   Ht 5' 4\" (1.626 m)   Wt 144 lb (65.3 kg)   SpO2 95%   BMI 24.72 kg/m²   Estimated body mass index is 24.72 kg/m² as calculated from the following:    Height as of this encounter: 5' 4\" (1.626 m). Weight as of this encounter: 144 lb (65.3 kg). General:  Martín Hilario is a 62y.o. year old female who appears her stated age. HEENT: Normocephalic atraumatic. Neck supple. Chest: regular rate; pulses equal  Abdomen: Soft nontender nondistended.   Ext: DP and PT pulses 2+, good cap refill  Neuro    Mentation  Appropriate affect  Registration intact  Orientation intact  Judgement intact to situation    Cranial Nerves:   Pupils equal and reactive to light  Extraocular motion intact  Face and shrug symmetric  Tongue midline  No dysarthria  v1-3 sensation symmetric, masseter tone symmetric  Hearing symmetric    Sensation: Decreased bilateral hands sparing thumbs    Motor  L deltoid 5/5; R deltoid 5/5  L biceps 5/5; R biceps 5/5  L triceps 5/5; R triceps 5/5  L wrist extension 5/5; R wrist extension 5/5  L intrinsics 4/5; R intrinsics 4+/5     L iliopsoas 5/5 , R iliopsoas 5/5  L quadriceps 5/5; R quadriceps 5/5  L Dorsiflexion 5/5; R dorsiflexion 5/5  L Plantarflexion 5/5; R plantarflexion 5/5  L EHL 5/5; R EHL

## 2022-08-16 ENCOUNTER — HOSPITAL ENCOUNTER (OUTPATIENT)
Dept: MRI IMAGING | Age: 62
Discharge: HOME | End: 2022-08-18
Payer: COMMERCIAL

## 2022-08-16 DIAGNOSIS — R29.2 HYPERREFLEXIA: ICD-10-CM

## 2022-08-16 DIAGNOSIS — G95.9 MYELOPATHY (HCC): ICD-10-CM

## 2022-08-16 PROCEDURE — 72141 MRI NECK SPINE W/O DYE: CPT

## 2022-08-17 ENCOUNTER — TELEPHONE (OUTPATIENT)
Dept: NEUROSURGERY | Age: 62
End: 2022-08-17

## 2022-08-17 NOTE — TELEPHONE ENCOUNTER
[facility] called regarding patient stat results of her MRI Cervical Spine     FINDINGS:   BONES/ALIGNMENT: The vertebral body heights appear maintained. There is mild   retrolisthesis at C5-C6 and C6-C7. No significant spondylolisthesis at the   remaining levels. There is loss of disc space height at C5-C6 and C6-C7. There is extensive Modic type 1 degenerative endplate changes at W3-M9. Moderate Modic type 2 degenerative endplate changes at B4-R2. Bone marrow   edema within the left C3 facet, which may be degenerative in nature. An   intraosseous hemangioma is seen in the T3 vertebral body. SPINAL CORD: There is T2 hyperintensity within the cord centered at the C5-C6   and C6-C7 levels. SOFT TISSUES: No paraspinal mass identified. C2-C3: There is a disc bulge with buckling of the ligamentum flavum. Minimal   spinal canal stenosis. No significant neural foraminal narrowing. Findings   are progressed. C3-C4: There is a disc bulge with buckling of the ligamentum flavum. Mild   spinal canal stenosis. Uncovertebral joint and facet arthrosis contribute to   moderate to severe right and severe left neural foraminal narrowing. Findings appear progressed. C4-C5: There is a disc bulge with buckling of the ligamentum flavum. Mild   spinal canal stenosis. Uncovertebral joint and facet arthrosis contribute to   moderate bilateral neural foraminal narrowing. Findings appear progressed. C5-C6: There is a posterior disc osteophyte complex, which slightly indents   on the ventral cord along with buckling of the ligamentum flavum. There is   effacement of the CSF with moderate spinal canal stenosis. T2 hyperintensity   seen within the cord at this level. There is severe right and moderate to   severe left neural foraminal narrowing. Findings are progressed. C6-C7: There is a posterior disc osteophyte complex with buckling of the   ligamentum flavum.   There is effacement of the CSF with moderate spinal canal   stenosis. T2 hyperintensity is seen within the cord at this level. There   appears to be severe bilateral neural foraminal narrowing. Findings appear   progressed. C7-T1: There is a disc bulge without significant spinal canal stenosis. Uncovertebral joint and facet arthrosis contributes to severe right and mild   left neural foraminal narrowing. Findings appear progressed. Impression   1. There is moderate spinal canal stenosis with effacement of the CSF at   C5-C6 and C6-C7. T2 hyperintensity is seen within the cord at these levels   possibly representing myelomalacia. 2. Mild spinal canal stenosis at C3-C4 and C4-C5 with minimal stenosis at   C2-C3. 3. Multilevel neural foraminal as above. 4. There is mild retrolisthesis with loss of disc space height and Modic type   degenerative endplate changes at B7-D3 and C6-C7.   5. Bone marrow edema is seen in the left C3 facet, which may be degenerative   in nature. These results were sent to the [facility] [address] ([address] [place] [address] Road) on   8/17/2022 at 1:13 pm to be communicated to the referring/covering health care   provider/office.

## 2022-08-18 DIAGNOSIS — M48.02 CERVICAL STENOSIS OF SPINE: Primary | ICD-10-CM

## 2022-08-18 DIAGNOSIS — M47.12 CERVICAL SPONDYLOSIS WITH MYELOPATHY: ICD-10-CM

## 2022-08-18 NOTE — TELEPHONE ENCOUNTER
MRI shows evidence of stenosis with likely chronic compression of spinal cord. Orders have been placed for flexion/extension xrays. Please move follow up appointment to the next 2-3 weeks with one of the docs, chaim.

## 2022-08-19 ENCOUNTER — HOSPITAL ENCOUNTER (OUTPATIENT)
Facility: CLINIC | Age: 62
Discharge: HOME | End: 2022-08-21
Payer: COMMERCIAL

## 2022-08-19 ENCOUNTER — HOSPITAL ENCOUNTER (OUTPATIENT)
Dept: GENERAL RADIOLOGY | Facility: CLINIC | Age: 62
Discharge: HOME | End: 2022-08-21
Payer: COMMERCIAL

## 2022-08-19 DIAGNOSIS — M48.02 CERVICAL STENOSIS OF SPINE: ICD-10-CM

## 2022-08-19 DIAGNOSIS — M47.12 CERVICAL SPONDYLOSIS WITH MYELOPATHY: ICD-10-CM

## 2022-08-19 PROCEDURE — 72050 X-RAY EXAM NECK SPINE 4/5VWS: CPT

## 2022-08-29 ENCOUNTER — OFFICE VISIT (OUTPATIENT)
Dept: NEUROSURGERY | Age: 62
End: 2022-08-29
Payer: COMMERCIAL

## 2022-08-29 VITALS
TEMPERATURE: 97.2 F | WEIGHT: 135.7 LBS | OXYGEN SATURATION: 97 % | HEIGHT: 64 IN | BODY MASS INDEX: 23.17 KG/M2 | HEART RATE: 92 BPM | DIASTOLIC BLOOD PRESSURE: 97 MMHG | SYSTOLIC BLOOD PRESSURE: 151 MMHG

## 2022-08-29 DIAGNOSIS — M48.02 STENOSIS OF CERVICAL SPINE WITH MYELOPATHY (HCC): Primary | ICD-10-CM

## 2022-08-29 DIAGNOSIS — G99.2 STENOSIS OF CERVICAL SPINE WITH MYELOPATHY (HCC): Primary | ICD-10-CM

## 2022-08-29 PROCEDURE — 99214 OFFICE O/P EST MOD 30 MIN: CPT | Performed by: NEUROLOGICAL SURGERY

## 2022-08-29 NOTE — PROGRESS NOTES
[address]  [facility] [place] [facility] [address] [facility] [address]  Dept: [phone]    Patient:  Michael Potter  YOB: 1960  Date: 8/29/22    The patient is a 65 y.o. female who presents today for consult of the following problems:     Chief Complaint   Patient presents with    Other     Patient does not report any pain. HPI:     Michael Potter is a 65 y.o. female on whom neurosurgical consultation was requested by STEPHY Mena CNP for management of Cervical stenosis with myelopathy. The patient has had progressive onset of 2 years of numbness and tingling to bilateral upper extremities sparing the thumb distal to the forearm. Significant issues with dexterity including buttoning writing tying her shoes and utilizing hands in general as well as driving. Has different significant cramping and aching with difficulty opening jars as well. Had does have some balance issues and ataxia but does not use an assistive device such as a cane or walker and does not have trouble with falls. Denies bowel bladder incontinence.       History:     Past Medical History:   Diagnosis Date    Depression     History of abnormal Pap smear     ASCUS (+)HPV     Past Surgical History:   Procedure Laterality Date    COLONOSCOPY  2020    Dr Jesse Covington  01/01/2005    HAND SURGERY  01/01/2013    right hand, trigger thumb     Family History   Problem Relation Age of Onset    Breast Cancer Mother     Cirrhosis Mother     Alcohol Abuse Mother     Hypertension Father     Breast Cancer Maternal Grandmother      Current Outpatient Medications on File Prior to Visit   Medication Sig Dispense Refill    tiotropium (SPIRIVA RESPIMAT) 2.5 MCG/ACT AERS inhaler Inhale 2 puffs into the lungs daily 4 g 3    tiotropium (SPIRIVA RESPIMAT) 2.5 MCG/ACT AERS inhaler Inhale 2 puffs into the lungs daily 4 g 0    lisinopril (PRINIVIL;ZESTRIL) 10 MG tablet Take 1 tablet by mouth daily 90 tablet 1    hydroCHLOROthiazide (HYDRODIURIL) 25 MG tablet Take 1 tablet by mouth daily 90 tablet 1    atorvastatin (LIPITOR) 20 MG tablet Take 1 tablet by mouth daily 90 tablet 1    venlafaxine (EFFEXOR) 37.5 MG tablet Take 1 tablet by mouth daily 90 tablet 1    albuterol sulfate  (90 Base) MCG/ACT inhaler INHALE 2 PUFFS BY MOUTH EVERY SIX HOURS AS NEEDED (Patient not taking: No sig reported)       No current facility-administered medications on file prior to visit. Social History     Tobacco Use    Smoking status: Former     Packs/day: 1.00     Years: 40.00     Pack years: 40.00     Types: Cigarettes     Start date: 1976     Quit date: 2017     Years since quittin.0    Smokeless tobacco: Never   Vaping Use    Vaping Use: Never used   Substance Use Topics    Alcohol use: Yes     Alcohol/week: 12.0 standard drinks     Types: 2 Glasses of wine, 10 Shots of liquor per week     Comment: Vodka 3 drinks per day    Drug use: No       No Known Allergies    Review of Systems  ROS: weakness numbness    Physical Exam:      BP (!) 145/92 (Site: Left Upper Arm, Position: Sitting, Cuff Size: Medium Adult)   Pulse 92   Temp 97.2 °F (36.2 °C) (Temporal)   Ht 5' 4\" (1.626 m)   Wt 135 lb 11.2 oz (61.6 kg)   SpO2 97%   BMI 23.29 kg/m²   Estimated body mass index is 23.29 kg/m² as calculated from the following:    Height as of this encounter: 5' 4\" (1.626 m). Weight as of this encounter: 135 lb 11.2 oz (61.6 kg). General:  Jermain Starkey is a 65y.o. year old female who appears her stated age. HEENT: Normocephalic atraumatic. Neck supple. Chest: regular rate; pulses equal. Equal chest rise and fall  Abdomen: Soft nondistended.    Ext: DP equal with good capillary refill  Neuro    Mentation  Appropriate affect   oriented    Cranial Nerves:   Pupils equal and reactive to light  Extraocular motion intact  Face symmetric  No dysarthria  v1-3 sensation symmetric, masseter tone symmetric  Hearing symmetric and intact to finger rub    Sensation:   Diminished     Motor  L deltoid 5/5; R deltoid 5/5  L biceps 5/5; R biceps 5/5  L triceps 5/5; R triceps 5/5  L wrist extension 5/5; R wrist extension 5/5   and intrinsics 4/5     L iliopsoas 5/5 , R iliopsoas 5/5  L quadriceps 5/5; R quadriceps 5/5  L Dorsiflexion 5/5; R dorsiflexion 5/5  L Plantarflexion 5/5; R plantarflexion 5/5  L EHL 5/5; R EHL 5/5    Reflexes  L Brachioradialis 2+/4; R brachioradialis 2+/4  L Biceps 2+/4; R Biceps 2+/4  L Triceps 2+/4; R Triceps 2+/4  L Patellar 2+/4: R Patellar 2+/4  L Achilles 2+/4; R Achilles 2+/4    hoffmans L: neg  hoffmans R: neg  Clonus L: neg  Clonus R: neg  Babinski L: up  Babinski R; up    Studies Review:     Mri c -severe stenosis at C5-6 as well as C6-7 both at the mid body as well as the disc level with significant cord myelomalacia. Assessment and Plan:      1. Stenosis of cervical spine with myelopathy [facility])          Plan: Extensive discussion the patient regarding the findings on MRI as well as her clinical presentation that coincides with cervical myelopathy with stenosis. At this point considering the progressive onset of symptoms and significant issues with dexterity as well as sensory neuropathy would strongly recommend decompressive surgery in the form of an anterior C6 corpectomy. I did relay her to the pathology itself which is likely progress over the course of the long-term and involves a difficulty with recovery in the setting of delayed intervention. Additionally I believe that in the setting of the minor trauma and hyperextension injury she is at significantly higher risk of spinal cord injury in the setting.   I explained the surgical invention that involves an anterior C6 corpectomy that involves anterior dissection of the neck and removal of the C6 vertebrae as well as the cephalad and caudad discs and replacement with a titanium graft as well as anterior plating to accomplish a two-level disc fusion. I explained that this surgery will take approximately 3 hours with 1 night hospital stay and overall recovery of 3 months with 10 weeks in a collar. I did explain that the main risks include dysphagia as well as hoarseness due to retraction of the esophagus and recurrent laryngeal nerves. Also discussed the critical structures in this region including the vertebral arteries carotid arteries and the spinal cord with risk associated structures which is generally fairly low. Followup: No follow-ups on file. Prescriptions Ordered:  No orders of the defined types were placed in this encounter. Orders Placed:  No orders of the defined types were placed in this encounter. Electronically signed by Patricia Palacios DO on 8/29/2022 at 11:49 AM    Please note that this chart was generated using voice recognition Dragon dictation software. Although every effort was made to ensure the accuracy of this automated transcription, some errors in transcription may have occurred.

## 2022-09-08 ENCOUNTER — TELEPHONE (OUTPATIENT)
Dept: NEUROSURGERY | Age: 62
End: 2022-09-08

## 2022-09-08 DIAGNOSIS — G99.2 STENOSIS OF CERVICAL SPINE WITH MYELOPATHY (HCC): Primary | ICD-10-CM

## 2022-09-08 DIAGNOSIS — M48.02 STENOSIS OF CERVICAL SPINE WITH MYELOPATHY (HCC): Primary | ICD-10-CM

## 2022-09-08 RX ORDER — CYCLOBENZAPRINE HCL 10 MG
10 TABLET ORAL 3 TIMES DAILY PRN
Qty: 21 TABLET | Refills: 0 | Status: SHIPPED | OUTPATIENT
Start: 2022-09-08 | End: 2022-09-18

## 2022-09-08 NOTE — TELEPHONE ENCOUNTER
Muscle relaxer sent to pharmacy to take as needed, may make her drowsy-do not recommend driving while take the medicine

## 2022-09-08 NOTE — TELEPHONE ENCOUNTER
Patient is having spasms in her neck that are keeping her up at night she is requesting a muscle relaxer.

## 2022-09-15 ENCOUNTER — TELEPHONE (OUTPATIENT)
Dept: ONCOLOGY | Age: 62
End: 2022-09-15

## 2022-09-15 NOTE — LETTER
9/15/2022        [place] [address]    Dear Cyndi Godoy:    Your healthcare provider has ordered a low dose CT scan of the chest for lung cancer screening. Enclosed you will find information about CT lung screening and smoking cessation resources. If you are unable to keep you appointment for you CT lung screening, please call our scheduling department at [phone]    Keep in mind that CT lung screening does not take the place of smoking cessation. Please do not hesitate to contact me if you have any questions or concerns.     [address] [facility],      [facility] Scre[facility]  [phone]

## 2022-09-26 ENCOUNTER — HOSPITAL ENCOUNTER (OUTPATIENT)
Dept: CT IMAGING | Age: 62
Discharge: HOME | End: 2022-09-28
Payer: COMMERCIAL

## 2022-09-26 VITALS — HEIGHT: 64 IN | WEIGHT: 135 LBS | BODY MASS INDEX: 23.05 KG/M2

## 2022-09-26 DIAGNOSIS — Z87.891 PERSONAL HISTORY OF TOBACCO USE: ICD-10-CM

## 2022-09-26 DIAGNOSIS — R91.1 SOLID NODULE OF LUNG GREATER THAN 8 MM IN DIAMETER: Primary | ICD-10-CM

## 2022-09-26 PROCEDURE — 71271 CT THORAX LUNG CANCER SCR C-: CPT

## 2022-09-30 RX ORDER — SODIUM CHLORIDE, SODIUM LACTATE, POTASSIUM CHLORIDE, CALCIUM CHLORIDE 600; 310; 30; 20 MG/100ML; MG/100ML; MG/100ML; MG/100ML
1000 INJECTION, SOLUTION INTRAVENOUS CONTINUOUS
Status: CANCELLED | OUTPATIENT
Start: 2022-09-30

## 2022-09-30 NOTE — DISCHARGE INSTRUCTIONS
Pre-operative Instructions    Please arrive at the surgery center by 6:00 AM on 10/18/2022  (or as directed by your surgeon's office). See Directons to Surgery Center below. FASTING    NOTHING TO EAT OR DRINK AFTER MIDNIGHT the night prior to surgery (This includes gum, candy, mints, chewing tobacco, etc). MEDICATIONS    What to STOP: ANY BLOOD THINNING MEDICATION(S) as directed by your surgeon or prescribing physician. FAILURE TO STOP CERTAIN MEDICATIONS MAY INTERFERE WITH YOUR SCHEDULED SURGERY. According to the medication list you provided today, PLEASE STOP: vitamin E    2. What to CONTINUE leading up to your surgery:   Please take all your other daily medications except the medications listed above that you were instructed to hold. 3. What to [place] with SMALL SIP OF WATER: lisinopril (Prinvil)                       IF APPLICABLE:  -If you have been given a blood band, you must bring it with you the day of surgery, unclasped.  -Use routine inhalers and bring inhalers the day of surgery.   -Bring C-Pap/Bi-pap with you morning of surgery if planning on staying in the hospital overnight.  -Do not take diabetic medications on the day of surgery. OTHER IMPORTANT REMINDERS    1) Please REMEMBER to get Covid-19 Screening if scheduled    2) You may be required to provide a urine sample upon your arrival to the pre-op area, so please take this into consideration. 3) If  NOT planning on staying in the hospital overnight : A. You will need an adult family member /friend to drive you home after your procedure. Taxi cabs or any form of public transportation ALONE is not acceptable.   -Your  must be 25years of age or older and able to sign off on your discharge instructions.     -It is preferable that the friend or family member stay at the hospital throughout your procedure.   Tyra Ruiz must remain with you once you have arrived home for the first 24 hours after your surgery if you receive anesthesia or medication. If you do not have someone to stay with you, your procedure may be cancelled. 4) Do not wear any jewelry or body piercings day of surgery. 5) In case of illness - If you have cold or flu like symptoms (high fever, runny nose, sore throat, cough, etc.) rash, nausea, vomiting, loose stools, and/or recent contact with someone who has a contagious disease (Covid-19, chicken pox, measles, etc.) PLEASE notify your surgeon as soon as possible. 9/30/22  2:37 PM      ___________________  _______________________  Signature (Provider)              Signature (Patient)     Day of Surgery/Procedure    As a patient at [facility] you can expect quality medical and nursing care that is centered on your individual needs. Our goal is to make your surgical experience as comfortable as possible  . Directions to the [address] [place] [address] is located at [address] [facility] [place], [address] [facility]. Please pull into the Emergency/Surgery Center parking lot or there is additional parking across the street. You will enter the facility under through the glass doors and proceed to registration check-in which is right inside the door. Thereafter you will be directed to the [address] [place] [address]. Patient Instructions    ·Please shower the night before and the morning of surgery with an antibacterial soap. Please use the cleaning solution (bottle) given to you the night before your surgery after your shower. Unless otherwise told by your physician, please do not shave legs or any part of your body below your neck the night before or day of your surgery. You may shave your face or neck. ·Please wear loose, comfortable clothing. If you are potentially going to have a cast or brace bring clothing that will fit over them.       ·Bring a list of all medications you take, along with the dose of the medications and how often you take it. If more convenient bring the pharmacy bottles in a zip lock bag. ·Brush your teeth but do not swallow water. ·Bring your eyeglasses and case with you. No contacts are to be worn the day of surgery. You also may bring your hearing aids. ·Do not bring any valuables, such as jewelry, cash or credit cards. If you are staying overnight with us, please bring a SMALL bag of personal items. We cannot accommodate large items, like suitcases. ·If your child is having surgery please make arrangements for any other children to be cared for at home on the day of surgery. Other children are not permitted in recovery room and we want you to be able to spend time with the patient. If other arrangements are not available then we suggest that you have a second adult to stay in the waiting room. ·If you are having any type of anesthesia you are to have nothing to eat or drink after midnight the night before your surgery. This includes gum, mints, water or smoking or chewing tobacco.  The only exception to this is a small sip of water to take with any morning dose of heart, blood pressure, or seizure medications. ·Bring your inhaler if you are currently using one. ·Bring your blood band if one has been given to you. Please do not close the clasp. ·If you are on C-PAP or Bi-PAP at home and plan on staying in the hospital overnight for your surgery please bring the machine with you. ·Do not wear any jewelry or body piercings day of surgery. Also, NO lotion, perfume or deodorant to be used the day of surgery. If you have any other questions regarding your procedure/surgery please call  your surgeon's office.      If you have a last minute question(s) the DAY OF your surgery, you may call [phone]

## 2022-10-03 ENCOUNTER — TELEPHONE (OUTPATIENT)
Dept: CASE MANAGEMENT | Age: 62
End: 2022-10-03

## 2022-10-03 NOTE — TELEPHONE ENCOUNTER
Lung navigator reviewing recent Lung Screenig results notes and reviewed per provider and 3 month F/U recommended.

## 2022-10-05 ENCOUNTER — HOSPITAL ENCOUNTER (OUTPATIENT)
Dept: PREADMISSION TESTING | Age: 62
Discharge: HOME | End: 2022-10-09
Payer: COMMERCIAL

## 2022-10-05 VITALS
OXYGEN SATURATION: 96 % | DIASTOLIC BLOOD PRESSURE: 79 MMHG | WEIGHT: 134 LBS | SYSTOLIC BLOOD PRESSURE: 115 MMHG | TEMPERATURE: 97 F | BODY MASS INDEX: 22.88 KG/M2 | HEART RATE: 100 BPM | HEIGHT: 64 IN | RESPIRATION RATE: 18 BRPM

## 2022-10-05 LAB
ABO/RH: NORMAL
ANION GAP SERPL CALCULATED.3IONS-SCNC: 16 MMOL/L (ref 9–17)
ANTIBODY SCREEN: NEGATIVE
ARM BAND NUMBER: NORMAL
BUN BLDV-MCNC: 9 MG/DL (ref 8–23)
CHLORIDE BLD-SCNC: 95 MMOL/L (ref 98–107)
CO2: 24 MMOL/L (ref 20–31)
CREAT SERPL-MCNC: 0.64 MG/DL (ref 0.5–0.9)
EXPIRATION DATE: NORMAL
GFR SERPL CREATININE-BSD FRML MDRD: >60 ML/MIN/1.73M2
GLUCOSE BLD-MCNC: 104 MG/DL (ref 70–99)
HCT VFR BLD CALC: 40.8 % (ref 36.3–47.1)
HEMOGLOBIN: 14.8 G/DL (ref 11.9–15.1)
INR BLD: 0.9
MCH RBC QN AUTO: 34.9 PG (ref 25.2–33.5)
MCHC RBC AUTO-ENTMCNC: 36.3 G/DL (ref 28.4–34.8)
MCV RBC AUTO: 96.2 FL (ref 82.6–102.9)
NRBC AUTOMATED: 0 PER 100 WBC
PARTIAL THROMBOPLASTIN TIME: 25.2 SEC (ref 20.5–30.5)
PDW BLD-RTO: 11.8 % (ref 11.8–14.4)
PLATELET # BLD: 328 K/UL (ref 138–453)
PMV BLD AUTO: 9.4 FL (ref 8.1–13.5)
POTASSIUM SERPL-SCNC: 3.3 MMOL/L (ref 3.7–5.3)
PROTHROMBIN TIME: 9.7 SEC (ref 9.1–12.3)
RBC # BLD: 4.24 M/UL (ref 3.95–5.11)
SODIUM BLD-SCNC: 135 MMOL/L (ref 135–144)
WBC # BLD: 6 K/UL (ref 3.5–11.3)

## 2022-10-05 PROCEDURE — 85610 PROTHROMBIN TIME: CPT

## 2022-10-05 PROCEDURE — 86900 BLOOD TYPING SEROLOGIC ABO: CPT

## 2022-10-05 PROCEDURE — 36415 COLL VENOUS BLD VENIPUNCTURE: CPT

## 2022-10-05 PROCEDURE — 86901 BLOOD TYPING SEROLOGIC RH(D): CPT

## 2022-10-05 PROCEDURE — 82947 ASSAY GLUCOSE BLOOD QUANT: CPT

## 2022-10-05 PROCEDURE — 84520 ASSAY OF UREA NITROGEN: CPT

## 2022-10-05 PROCEDURE — 86850 RBC ANTIBODY SCREEN: CPT

## 2022-10-05 PROCEDURE — 80051 ELECTROLYTE PANEL: CPT

## 2022-10-05 PROCEDURE — 82565 ASSAY OF CREATININE: CPT

## 2022-10-05 PROCEDURE — 85730 THROMBOPLASTIN TIME PARTIAL: CPT

## 2022-10-05 PROCEDURE — 85027 COMPLETE CBC AUTOMATED: CPT

## 2022-10-05 PROCEDURE — 93005 ELECTROCARDIOGRAM TRACING: CPT | Performed by: ANESTHESIOLOGY

## 2022-10-05 RX ORDER — M-VIT,TX,IRON,MINS/CALC/FOLIC 27MG-0.4MG
1 TABLET ORAL DAILY
COMMUNITY

## 2022-10-05 RX ORDER — MULTIVIT-MIN/IRON/FOLIC ACID/K 18-600-40
1 CAPSULE ORAL DAILY
COMMUNITY

## 2022-10-05 NOTE — PROGRESS NOTES
Anesthesia Focused Assessment    Hx of anesthesia complications:  no  Family hx of anesthesia complications:  no      Prior + Covid-19 test? no        STOP-BANG Sleep Apnea Questionnaire    SNORE loudly (heard through closed doors)? No  TIRED, fatigued, sleepy during daytime? No  OBSERVED stopping breathing during sleep? No  High blood PRESSURE or being treated? Yes    BMI over 35? No  AGE over 48? Yes  NECK circumference over 16\"? No  GENDER (male)? No             Total 2  High risk 5-8  Intermediate risk 3-4  Low risk 0-2    ----------------------------------------------------------------------------------------------------------------------  FRANCISCO                              No  If yes, machine? DM1                                            No  DM2                   No    Coronary Artery Disease      No  HTN         Yes  Defib/AICD/Pacemaker               No             Renal Failure                   No  If yes, on dialysis           Active smoker? No, quit in 2017  Drinks alcohol? Yes, 3 per day  Illicit drugs? No  Dentition?        benign      Past Medical History:   Diagnosis Date    COPD (chronic obstructive pulmonary disease) ([facility] [address]     Depression     History of abnormal Pap smear     ASCUS (+)HPV    HLD (hyperlipidemia)     Hypertension     Liver disease     Liver mass     Stenosis of cervical spine     Under care of service provider 10/05/2022    pcp-rose veronica-last visit july 2022    Under care of service provider 10/05/2022    reese carlos-last visit june 2022         Patient was evaluated in PAT & anesthesia guidelines were applied. NPO guidelines, medication instructions and scheduled arrival time were reviewed with patient.                                                                                                                        Anesthesia contacted:   no    Medical or cardiac clearance ordered: kunal Mcintyre, STEPHY - CNP   10/5/22  3:16 PM

## 2022-10-05 NOTE — H&P (VIEW-ONLY)
History and Physical    Pt Name: Carlos A Gruber  MRN: [id number]  YOB: 1960  Date of evaluation: 10/5/2022  Primary Care Physician: STEPHY Romero CNP    SUBJECTIVE:   History of Chief Complaint:    Carlos A Gruber is a 65 y.o. female who presents for PAT appointment. Patient complains of numbness to bilateral arms, from elbows to fingertips for the last few years. Patient denies any neck, back or arm pain. She states she will drop itms due to the numbness. Patient states she fell in her shower a few years ago, causing injury to her back. Patient has been scheduled for C6 CERVICAL CORPECTOMY  Allergies  has No Known Allergies. Medications  Prior to Admission medications    Medication Sig Start Date End Date Taking?  Authorizing Provider   Multiple Vitamins-Minerals (THERAPEUTIC MULTIVITAMIN-MINERALS) tablet Take 1 tablet by mouth daily   Yes Historical Provider, MD   Calcium Carbonate (CALCIUM 500 PO) Take 1 tablet by mouth daily   Yes Historical Provider, MD   vitamin E 100 units capsule Take 100 Units by mouth daily   Yes Historical Provider, MD   Cholecalciferol (VITAMIN D) 50 MCG (2000 UT) CAPS capsule Take 1 capsule by mouth daily   Yes Historical Provider, MD   tiotropium (SPIRIVA RESPIMAT) 2.5 MCG/ACT AERS inhaler Inhale 2 puffs into the lungs daily  Patient not taking: Reported on 10/5/2022 6/27/22   STEPHY Odom CNP   tiotropium (SPIRIVA RESPIMAT) 2.5 MCG/ACT AERS inhaler Inhale 2 puffs into the lungs daily  Patient not taking: Reported on 10/5/2022 6/27/22   STEPHY Odom CNP   lisinopril (PRINIVIL;ZESTRIL) 10 MG tablet Take 1 tablet by mouth daily 6/27/22   STEPHY Odom CNP   hydroCHLOROthiazide (HYDRODIURIL) 25 MG tablet Take 1 tablet by mouth daily 6/27/22   STEPHY Odom CNP   atorvastatin (LIPITOR) 20 MG tablet Take 1 tablet by mouth daily 6/27/22   STEPHY Odom CNP   venlafaxine [facility]) 37.5 MG tablet Take 1 tablet by mouth daily 6/27/22 Juan Colt, APRN - RODRIGO   albuterol sulfate  (90 Base) MCG/ACT inhaler INHALE 2 PUFFS BY MOUTH EVERY SIX HOURS AS NEEDED 4/3/22   Historical Provider, MD     Past Medical History    has a past medical history of COPD (chronic obstructive pulmonary disease) ([facility] [address] Depression, History of abnormal Pap smear, HLD (hyperlipidemia), Hypertension, Liver disease, Liver mass, Stenosis of cervical spine, Under care of service provider, and Under care of service provider. Past Surgical History   has a past surgical history that includes Colposcopy (2005); Hand surgery (2013); Colonoscopy (); and Carpal tunnel release (Left, 2022). Social History   reports that she quit smoking about 5 years ago. Her smoking use included cigarettes. She started smoking about 46 years ago. She has a 40.00 pack-year smoking history. She has never used smokeless tobacco.    reports current alcohol use of about 12.0 standard drinks per week. reports no history of drug use. Marital Status   Children 0  Occupation none  Family History  Family Status   Relation Name Status    Mother Mom     Father      Fritz Opitz      family history includes Alcohol Abuse in her mother; Breast Cancer in her maternal grandmother and mother; Cirrhosis in her mother; Hypertension in her father.     Review of Systems:  CONSTITUTIONAL:   negative for fevers, chills, fatigue and malaise    EYES:   negative for double vision, blurred vision and photophobia    HEENT:   negative for tinnitus, epistaxis and sore throat     RESPIRATORY:   negative for cough, shortness of breath, wheezing     CARDIOVASCULAR:   negative for chest pain, palpitations, syncope, edema     GASTROINTESTINAL:   negative for nausea, vomiting     GENITOURINARY:   negative for incontinence     MUSCULOSKELETAL:   negative for neck or back pain     NEUROLOGICAL:   Reports numbness to bilateral arms/hands  negative for headaches and dizziness PSYCHIATRIC:   negative for anxiety       OBJECTIVE:   VITALS:  height is 5' 4\" (1.626 m) and weight is 134 lb (60.8 kg). Her temporal temperature is 97 °F (36.1 °C). Her blood pressure is 115/79 and her pulse is 100. Her respiration is 18 and oxygen saturation is 96%. CONSTITUTIONAL:alert & oriented x 3, no acute distress. Calm and pleasant. SKIN:  Warm and dry, no rashes to exposed areas of skin. HEAD:  Normocephalic, atraumatic. EYES: PERRL. EOMs intact. EARS:  Intact and equal bilaterally. Hearing grossly WNL. NOSE:  Nares patent. No rhinorrhea   MOUTH/THROAT:  Mucous membranes pink and moist, teeth appear to be intact. NECK:supple, good ROM. LUNGS: Respirations even and non-labored. Clear to auscultation bilaterally, no wheezes, rales, or rhonchi. CARDIOVASCULAR: Regular rate and rhythm, no murmurs. ABDOMEN: soft, non-tender, non-distended, bowel sounds active x 4   EXTREMITIES: No edema to bilateral lower extremities. No varicosities to bilateral lower extremities. NEUROLOGIC: CN II-XII are grossly intact. Gait is smooth. Testing:   EKG: 10/5/2022  Labs pending: drawn 10/5/2022   IMPRESSIONS:   Cervical stenosis.   PLANS:   C6 CERVICAL CORPECTOMY    STEPHY Smith CNP  Electronically signed 10/5/2022 at 3:14 PM

## 2022-10-05 NOTE — H&P
History and Physical    Pt Name: Skylar Jurado  MRN: [id number]  YOB: 1960  Date of evaluation: 10/5/2022  Primary Care Physician: STEPHY Grider CNP    SUBJECTIVE:   History of Chief Complaint:    Skylar Jurado is a 65 y.o. female who presents for PAT appointment. Patient complains of numbness to bilateral arms, from elbows to fingertips for the last few years. Patient denies any neck, back or arm pain. She states she will drop itms due to the numbness. Patient states she fell in her shower a few years ago, causing injury to her back. Patient has been scheduled for C6 CERVICAL CORPECTOMY  Allergies  has No Known Allergies. Medications  Prior to Admission medications    Medication Sig Start Date End Date Taking?  Authorizing Provider   Multiple Vitamins-Minerals (THERAPEUTIC MULTIVITAMIN-MINERALS) tablet Take 1 tablet by mouth daily   Yes Historical Provider, MD   Calcium Carbonate (CALCIUM 500 PO) Take 1 tablet by mouth daily   Yes Historical Provider, MD   vitamin E 100 units capsule Take 100 Units by mouth daily   Yes Historical Provider, MD   Cholecalciferol (VITAMIN D) 50 MCG (2000 UT) CAPS capsule Take 1 capsule by mouth daily   Yes Historical Provider, MD   tiotropium (SPIRIVA RESPIMAT) 2.5 MCG/ACT AERS inhaler Inhale 2 puffs into the lungs daily  Patient not taking: Reported on 10/5/2022 6/27/22   STEPHY Bautista CNP   tiotropium (SPIRIVA RESPIMAT) 2.5 MCG/ACT AERS inhaler Inhale 2 puffs into the lungs daily  Patient not taking: Reported on 10/5/2022 6/27/22   STEPHY Bautista CNP   lisinopril (PRINIVIL;ZESTRIL) 10 MG tablet Take 1 tablet by mouth daily 6/27/22   TSEPHY Bautista CNP   hydroCHLOROthiazide (HYDRODIURIL) 25 MG tablet Take 1 tablet by mouth daily 6/27/22   STEPHY Bautista CNP   atorvastatin (LIPITOR) 20 MG tablet Take 1 tablet by mouth daily 6/27/22   STEPHY Bautista CNP   venlafaxine [facility]) 37.5 MG tablet Take 1 tablet by mouth daily 6/27/22 Reecerudy Colon, APRN - CNP   albuterol sulfate  (90 Base) MCG/ACT inhaler INHALE 2 PUFFS BY MOUTH EVERY SIX HOURS AS NEEDED 4/3/22   Historical Provider, MD     Past Medical History    has a past medical history of COPD (chronic obstructive pulmonary disease) ([facility] [address] Depression, History of abnormal Pap smear, HLD (hyperlipidemia), Hypertension, Liver disease, Liver mass, Stenosis of cervical spine, Under care of service provider, and Under care of service provider. Past Surgical History   has a past surgical history that includes Colposcopy (2005); Hand surgery (2013); Colonoscopy (); and Carpal tunnel release (Left, 2022). Social History   reports that she quit smoking about 5 years ago. Her smoking use included cigarettes. She started smoking about 46 years ago. She has a 40.00 pack-year smoking history. She has never used smokeless tobacco.    reports current alcohol use of about 12.0 standard drinks per week. reports no history of drug use. Marital Status   Children 0  Occupation none  Family History  Family Status   Relation Name Status    Mother Mom     Father      Darshana Earl     family history includes Alcohol Abuse in her mother; Breast Cancer in her maternal grandmother and mother; Cirrhosis in her mother; Hypertension in her father.     Review of Systems:  CONSTITUTIONAL:   negative for fevers, chills, fatigue and malaise    EYES:   negative for double vision, blurred vision and photophobia    HEENT:   negative for tinnitus, epistaxis and sore throat     RESPIRATORY:   negative for cough, shortness of breath, wheezing     CARDIOVASCULAR:   negative for chest pain, palpitations, syncope, edema     GASTROINTESTINAL:   negative for nausea, vomiting     GENITOURINARY:   negative for incontinence     MUSCULOSKELETAL:   negative for neck or back pain     NEUROLOGICAL:   Reports numbness to bilateral arms/hands  negative for headaches and dizziness PSYCHIATRIC:   negative for anxiety       OBJECTIVE:   VITALS:  height is 5' 4\" (1.626 m) and weight is 134 lb (60.8 kg). Her temporal temperature is 97 °F (36.1 °C). Her blood pressure is 115/79 and her pulse is 100. Her respiration is 18 and oxygen saturation is 96%. CONSTITUTIONAL:alert & oriented x 3, no acute distress. Calm and pleasant. SKIN:  Warm and dry, no rashes to exposed areas of skin. HEAD:  Normocephalic, atraumatic. EYES: PERRL. EOMs intact. EARS:  Intact and equal bilaterally. Hearing grossly WNL. NOSE:  Nares patent. No rhinorrhea   MOUTH/THROAT:  Mucous membranes pink and moist, teeth appear to be intact. NECK:supple, good ROM. LUNGS: Respirations even and non-labored. Clear to auscultation bilaterally, no wheezes, rales, or rhonchi. CARDIOVASCULAR: Regular rate and rhythm, no murmurs. ABDOMEN: soft, non-tender, non-distended, bowel sounds active x 4   EXTREMITIES: No edema to bilateral lower extremities. No varicosities to bilateral lower extremities. NEUROLOGIC: CN II-XII are grossly intact. Gait is smooth. Testing:   EKG: 10/5/2022  Labs pending: drawn 10/5/2022   IMPRESSIONS:   Cervical stenosis.   PLANS:   C6 CERVICAL CORPECTOMY    STEPHY Smith CNP  Electronically signed 10/5/2022 at 3:14 PM

## 2022-10-06 ENCOUNTER — TELEPHONE (OUTPATIENT)
Dept: FAMILY MEDICINE CLINIC | Age: 62
End: 2022-10-06

## 2022-10-06 DIAGNOSIS — E87.6 DIURETIC-INDUCED HYPOKALEMIA: Primary | ICD-10-CM

## 2022-10-06 DIAGNOSIS — T50.2X5A DIURETIC-INDUCED HYPOKALEMIA: Primary | ICD-10-CM

## 2022-10-06 LAB
EKG ATRIAL RATE: 81 BPM
EKG P AXIS: 44 DEGREES
EKG P-R INTERVAL: 120 MS
EKG Q-T INTERVAL: 406 MS
EKG QRS DURATION: 86 MS
EKG QTC CALCULATION (BAZETT): 471 MS
EKG R AXIS: 28 DEGREES
EKG T AXIS: 11 DEGREES
EKG VENTRICULAR RATE: 81 BPM

## 2022-10-06 PROCEDURE — 93010 ELECTROCARDIOGRAM REPORT: CPT | Performed by: INTERNAL MEDICINE

## 2022-10-06 RX ORDER — POTASSIUM CHLORIDE 20 MEQ/1
20 TABLET, EXTENDED RELEASE ORAL DAILY
Qty: 30 TABLET | Refills: 0 | Status: SHIPPED | OUTPATIENT
Start: 2022-10-06

## 2022-10-06 NOTE — TELEPHONE ENCOUNTER
Any from pre admission testing at [facility] calling to inform patient will be having surgery 10/18/22, her potassium is at 3.3 and they need to know what you would like to do

## 2022-10-11 ENCOUNTER — HOSPITAL ENCOUNTER (OUTPATIENT)
Age: 62
Setting detail: SPECIMEN
Discharge: HOME | End: 2022-10-11

## 2022-10-11 DIAGNOSIS — T50.2X5A DIURETIC-INDUCED HYPOKALEMIA: ICD-10-CM

## 2022-10-11 DIAGNOSIS — E87.6 DIURETIC-INDUCED HYPOKALEMIA: ICD-10-CM

## 2022-10-11 LAB — POTASSIUM SERPL-SCNC: 4.2 MMOL/L (ref 3.7–5.3)

## 2022-10-17 ENCOUNTER — ANESTHESIA EVENT (OUTPATIENT)
Dept: OPERATING ROOM | Age: 62
DRG: 472 | End: 2022-10-17
Payer: COMMERCIAL

## 2022-10-18 ENCOUNTER — HOSPITAL ENCOUNTER (INPATIENT)
Age: 62
LOS: 1 days | Discharge: HOME | DRG: 472 | End: 2022-10-19
Attending: NEUROLOGICAL SURGERY | Admitting: NEUROLOGICAL SURGERY
Payer: COMMERCIAL

## 2022-10-18 ENCOUNTER — APPOINTMENT (OUTPATIENT)
Dept: GENERAL RADIOLOGY | Age: 62
DRG: 472 | End: 2022-10-18
Attending: NEUROLOGICAL SURGERY
Payer: COMMERCIAL

## 2022-10-18 ENCOUNTER — ANESTHESIA (OUTPATIENT)
Dept: OPERATING ROOM | Age: 62
DRG: 472 | End: 2022-10-18
Payer: COMMERCIAL

## 2022-10-18 DIAGNOSIS — G89.18 ACUTE POST-OPERATIVE PAIN: Primary | ICD-10-CM

## 2022-10-18 DIAGNOSIS — Z98.1 S/P CERVICAL SPINAL FUSION: ICD-10-CM

## 2022-10-18 PROBLEM — G99.2 STENOSIS OF CERVICAL SPINE WITH MYELOPATHY (HCC): Status: ACTIVE | Noted: 2022-10-18

## 2022-10-18 PROBLEM — M48.02 STENOSIS OF CERVICAL SPINE WITH MYELOPATHY (HCC): Status: ACTIVE | Noted: 2022-10-18

## 2022-10-18 LAB — POC POTASSIUM: 3.8 MMOL/L (ref 3.5–4.5)

## 2022-10-18 PROCEDURE — 6360000002 HC RX W HCPCS: Performed by: REGISTERED NURSE

## 2022-10-18 PROCEDURE — 2500000003 HC RX 250 WO HCPCS: Performed by: REGISTERED NURSE

## 2022-10-18 PROCEDURE — 3700000000 HC ANESTHESIA ATTENDED CARE: Performed by: NEUROLOGICAL SURGERY

## 2022-10-18 PROCEDURE — 0RT30ZZ RESECTION OF CERVICAL VERTEBRAL DISC, OPEN APPROACH: ICD-10-PCS | Performed by: NEUROLOGICAL SURGERY

## 2022-10-18 PROCEDURE — 2500000003 HC RX 250 WO HCPCS: Performed by: NEUROLOGICAL SURGERY

## 2022-10-18 PROCEDURE — 2709999900 HC NON-CHARGEABLE SUPPLY: Performed by: NEUROLOGICAL SURGERY

## 2022-10-18 PROCEDURE — 7100000001 HC PACU RECOVERY - ADDTL 15 MIN: Performed by: NEUROLOGICAL SURGERY

## 2022-10-18 PROCEDURE — 2500000003 HC RX 250 WO HCPCS: Performed by: ANESTHESIOLOGY

## 2022-10-18 PROCEDURE — 2580000003 HC RX 258: Performed by: NEUROLOGICAL SURGERY

## 2022-10-18 PROCEDURE — 6360000002 HC RX W HCPCS: Performed by: ANESTHESIOLOGY

## 2022-10-18 PROCEDURE — 3600000014 HC SURGERY LEVEL 4 ADDTL 15MIN: Performed by: NEUROLOGICAL SURGERY

## 2022-10-18 PROCEDURE — 84132 ASSAY OF SERUM POTASSIUM: CPT

## 2022-10-18 PROCEDURE — 2500000003 HC RX 250 WO HCPCS: Performed by: NURSE ANESTHETIST, CERTIFIED REGISTERED

## 2022-10-18 PROCEDURE — 2580000003 HC RX 258: Performed by: REGISTERED NURSE

## 2022-10-18 PROCEDURE — 3209999900 FLUORO FOR SURGICAL PROCEDURES

## 2022-10-18 PROCEDURE — 2580000003 HC RX 258: Performed by: ANESTHESIOLOGY

## 2022-10-18 PROCEDURE — 1200000000 HC SEMI PRIVATE

## 2022-10-18 PROCEDURE — L0120 CERV FLEX N/ADJ FOAM PRE OTS: HCPCS | Performed by: NEUROLOGICAL SURGERY

## 2022-10-18 PROCEDURE — 22845 INSERT SPINE FIXATION DEVICE: CPT | Performed by: NEUROLOGICAL SURGERY

## 2022-10-18 PROCEDURE — C1713 ANCHOR/SCREW BN/BN,TIS/BN: HCPCS | Performed by: NEUROLOGICAL SURGERY

## 2022-10-18 PROCEDURE — 7100000000 HC PACU RECOVERY - FIRST 15 MIN: Performed by: NEUROLOGICAL SURGERY

## 2022-10-18 PROCEDURE — 63081 REMOVE VERT BODY DCMPRN CRVL: CPT | Performed by: NEUROLOGICAL SURGERY

## 2022-10-18 PROCEDURE — 2720000010 HC SURG SUPPLY STERILE: Performed by: NEUROLOGICAL SURGERY

## 2022-10-18 PROCEDURE — 72040 X-RAY EXAM NECK SPINE 2-3 VW: CPT

## 2022-10-18 PROCEDURE — 22854 INSJ BIOMECHANICAL DEVICE: CPT | Performed by: NEUROLOGICAL SURGERY

## 2022-10-18 PROCEDURE — 6370000000 HC RX 637 (ALT 250 FOR IP): Performed by: NEUROLOGICAL SURGERY

## 2022-10-18 PROCEDURE — 3700000001 HC ADD 15 MINUTES (ANESTHESIA): Performed by: NEUROLOGICAL SURGERY

## 2022-10-18 PROCEDURE — C1889 IMPLANT/INSERT DEVICE, NOC: HCPCS | Performed by: NEUROLOGICAL SURGERY

## 2022-10-18 PROCEDURE — 0RG20A0 FUSION OF 2 OR MORE CERVICAL VERTEBRAL JOINTS WITH INTERBODY FUSION DEVICE, ANTERIOR APPROACH, ANTERIOR COLUMN, OPEN APPROACH: ICD-10-PCS | Performed by: NEUROLOGICAL SURGERY

## 2022-10-18 PROCEDURE — 3600000004 HC SURGERY LEVEL 4 BASE: Performed by: NEUROLOGICAL SURGERY

## 2022-10-18 PROCEDURE — 6370000000 HC RX 637 (ALT 250 FOR IP): Performed by: REGISTERED NURSE

## 2022-10-18 PROCEDURE — 6360000002 HC RX W HCPCS: Performed by: NURSE ANESTHETIST, CERTIFIED REGISTERED

## 2022-10-18 DEVICE — IMPLANTABLE DEVICE: Type: IMPLANTABLE DEVICE | Site: SPINE CERVICAL | Status: FUNCTIONAL

## 2022-10-18 DEVICE — GRAFT BNE SUB SM 1ML CRYOPRESERVED VIABLE CORT CANC BNE: Type: IMPLANTABLE DEVICE | Site: SPINE CERVICAL | Status: FUNCTIONAL

## 2022-10-18 DEVICE — SCREW SPNL L16MM DIA4MM ANT CERV TI SELF DRL VAR ANG FULL: Type: IMPLANTABLE DEVICE | Site: SPINE CERVICAL | Status: FUNCTIONAL

## 2022-10-18 RX ORDER — LIDOCAINE HYDROCHLORIDE AND EPINEPHRINE 10; 10 MG/ML; UG/ML
INJECTION, SOLUTION INFILTRATION; PERINEURAL PRN
Status: DISCONTINUED | OUTPATIENT
Start: 2022-10-18 | End: 2022-10-18 | Stop reason: HOSPADM

## 2022-10-18 RX ORDER — SODIUM CHLORIDE, SODIUM LACTATE, POTASSIUM CHLORIDE, CALCIUM CHLORIDE 600; 310; 30; 20 MG/100ML; MG/100ML; MG/100ML; MG/100ML
INJECTION, SOLUTION INTRAVENOUS CONTINUOUS PRN
Status: DISCONTINUED | OUTPATIENT
Start: 2022-10-18 | End: 2022-10-18 | Stop reason: SDUPTHER

## 2022-10-18 RX ORDER — MIDAZOLAM HYDROCHLORIDE 1 MG/ML
INJECTION INTRAMUSCULAR; INTRAVENOUS PRN
Status: DISCONTINUED | OUTPATIENT
Start: 2022-10-18 | End: 2022-10-18 | Stop reason: SDUPTHER

## 2022-10-18 RX ORDER — ROCURONIUM BROMIDE 10 MG/ML
INJECTION, SOLUTION INTRAVENOUS PRN
Status: DISCONTINUED | OUTPATIENT
Start: 2022-10-18 | End: 2022-10-18 | Stop reason: SDUPTHER

## 2022-10-18 RX ORDER — LISINOPRIL 10 MG/1
10 TABLET ORAL DAILY
Status: DISCONTINUED | OUTPATIENT
Start: 2022-10-18 | End: 2022-10-19 | Stop reason: HOSPADM

## 2022-10-18 RX ORDER — OXYCODONE HYDROCHLORIDE 5 MG/1
5 TABLET ORAL EVERY 4 HOURS PRN
Status: DISCONTINUED | OUTPATIENT
Start: 2022-10-18 | End: 2022-10-19 | Stop reason: HOSPADM

## 2022-10-18 RX ORDER — ONDANSETRON 4 MG/1
4 TABLET, ORALLY DISINTEGRATING ORAL EVERY 8 HOURS PRN
Status: DISCONTINUED | OUTPATIENT
Start: 2022-10-18 | End: 2022-10-19 | Stop reason: HOSPADM

## 2022-10-18 RX ORDER — ONDANSETRON 2 MG/ML
INJECTION INTRAMUSCULAR; INTRAVENOUS PRN
Status: DISCONTINUED | OUTPATIENT
Start: 2022-10-18 | End: 2022-10-18 | Stop reason: SDUPTHER

## 2022-10-18 RX ORDER — FENTANYL CITRATE 50 UG/ML
INJECTION, SOLUTION INTRAMUSCULAR; INTRAVENOUS PRN
Status: DISCONTINUED | OUTPATIENT
Start: 2022-10-18 | End: 2022-10-18 | Stop reason: SDUPTHER

## 2022-10-18 RX ORDER — VENLAFAXINE 37.5 MG/1
37.5 TABLET ORAL DAILY
Status: DISCONTINUED | OUTPATIENT
Start: 2022-10-18 | End: 2022-10-19 | Stop reason: HOSPADM

## 2022-10-18 RX ORDER — SODIUM CHLORIDE 9 MG/ML
INJECTION, SOLUTION INTRAVENOUS PRN
Status: DISCONTINUED | OUTPATIENT
Start: 2022-10-18 | End: 2022-10-18 | Stop reason: HOSPADM

## 2022-10-18 RX ORDER — ONDANSETRON 2 MG/ML
4 INJECTION INTRAMUSCULAR; INTRAVENOUS
Status: DISCONTINUED | OUTPATIENT
Start: 2022-10-18 | End: 2022-10-18 | Stop reason: HOSPADM

## 2022-10-18 RX ORDER — ACETAMINOPHEN 325 MG/1
650 TABLET ORAL EVERY 6 HOURS
Status: DISCONTINUED | OUTPATIENT
Start: 2022-10-18 | End: 2022-10-19 | Stop reason: HOSPADM

## 2022-10-18 RX ORDER — SODIUM CHLORIDE 9 MG/ML
INJECTION, SOLUTION INTRAVENOUS CONTINUOUS
Status: DISCONTINUED | OUTPATIENT
Start: 2022-10-18 | End: 2022-10-19 | Stop reason: HOSPADM

## 2022-10-18 RX ORDER — SODIUM CHLORIDE 0.9 % (FLUSH) 0.9 %
5-40 SYRINGE (ML) INJECTION PRN
Status: DISCONTINUED | OUTPATIENT
Start: 2022-10-18 | End: 2022-10-18 | Stop reason: HOSPADM

## 2022-10-18 RX ORDER — HYDROCHLOROTHIAZIDE 25 MG/1
25 TABLET ORAL DAILY
Status: DISCONTINUED | OUTPATIENT
Start: 2022-10-18 | End: 2022-10-19 | Stop reason: HOSPADM

## 2022-10-18 RX ORDER — LIDOCAINE HYDROCHLORIDE 10 MG/ML
INJECTION, SOLUTION EPIDURAL; INFILTRATION; INTRACAUDAL; PERINEURAL PRN
Status: DISCONTINUED | OUTPATIENT
Start: 2022-10-18 | End: 2022-10-18 | Stop reason: SDUPTHER

## 2022-10-18 RX ORDER — FENTANYL CITRATE 50 UG/ML
50 INJECTION, SOLUTION INTRAMUSCULAR; INTRAVENOUS EVERY 5 MIN PRN
Status: DISCONTINUED | OUTPATIENT
Start: 2022-10-18 | End: 2022-10-18 | Stop reason: HOSPADM

## 2022-10-18 RX ORDER — SODIUM CHLORIDE 0.9 % (FLUSH) 0.9 %
5-40 SYRINGE (ML) INJECTION EVERY 12 HOURS SCHEDULED
Status: DISCONTINUED | OUTPATIENT
Start: 2022-10-18 | End: 2022-10-18 | Stop reason: HOSPADM

## 2022-10-18 RX ORDER — SODIUM CHLORIDE 9 MG/ML
INJECTION, SOLUTION INTRAVENOUS PRN
Status: DISCONTINUED | OUTPATIENT
Start: 2022-10-18 | End: 2022-10-19 | Stop reason: HOSPADM

## 2022-10-18 RX ORDER — CYCLOBENZAPRINE HCL 10 MG
10 TABLET ORAL 3 TIMES DAILY PRN
Status: DISCONTINUED | OUTPATIENT
Start: 2022-10-18 | End: 2022-10-19 | Stop reason: HOSPADM

## 2022-10-18 RX ORDER — SODIUM CHLORIDE, SODIUM LACTATE, POTASSIUM CHLORIDE, CALCIUM CHLORIDE 600; 310; 30; 20 MG/100ML; MG/100ML; MG/100ML; MG/100ML
1000 INJECTION, SOLUTION INTRAVENOUS CONTINUOUS
Status: DISCONTINUED | OUTPATIENT
Start: 2022-10-18 | End: 2022-10-18 | Stop reason: HOSPADM

## 2022-10-18 RX ORDER — OXYCODONE HYDROCHLORIDE 5 MG/1
10 TABLET ORAL EVERY 4 HOURS PRN
Status: DISCONTINUED | OUTPATIENT
Start: 2022-10-18 | End: 2022-10-19 | Stop reason: HOSPADM

## 2022-10-18 RX ORDER — ATORVASTATIN CALCIUM 20 MG/1
20 TABLET, FILM COATED ORAL DAILY
Status: DISCONTINUED | OUTPATIENT
Start: 2022-10-18 | End: 2022-10-19 | Stop reason: HOSPADM

## 2022-10-18 RX ORDER — MAGNESIUM HYDROXIDE 1200 MG/15ML
LIQUID ORAL CONTINUOUS PRN
Status: DISCONTINUED | OUTPATIENT
Start: 2022-10-18 | End: 2022-10-18 | Stop reason: HOSPADM

## 2022-10-18 RX ORDER — SODIUM CHLORIDE 0.9 % (FLUSH) 0.9 %
5-40 SYRINGE (ML) INJECTION PRN
Status: DISCONTINUED | OUTPATIENT
Start: 2022-10-18 | End: 2022-10-19 | Stop reason: HOSPADM

## 2022-10-18 RX ORDER — FENTANYL CITRATE 50 UG/ML
25 INJECTION, SOLUTION INTRAMUSCULAR; INTRAVENOUS EVERY 5 MIN PRN
Status: COMPLETED | OUTPATIENT
Start: 2022-10-18 | End: 2022-10-18

## 2022-10-18 RX ORDER — SODIUM CHLORIDE 0.9 % (FLUSH) 0.9 %
5-40 SYRINGE (ML) INJECTION EVERY 12 HOURS SCHEDULED
Status: DISCONTINUED | OUTPATIENT
Start: 2022-10-18 | End: 2022-10-19 | Stop reason: HOSPADM

## 2022-10-18 RX ORDER — POTASSIUM CHLORIDE 20 MEQ/1
20 TABLET, EXTENDED RELEASE ORAL DAILY
Status: DISCONTINUED | OUTPATIENT
Start: 2022-10-18 | End: 2022-10-19 | Stop reason: HOSPADM

## 2022-10-18 RX ORDER — SENNA AND DOCUSATE SODIUM 50; 8.6 MG/1; MG/1
1 TABLET, FILM COATED ORAL 2 TIMES DAILY
Status: DISCONTINUED | OUTPATIENT
Start: 2022-10-18 | End: 2022-10-19 | Stop reason: HOSPADM

## 2022-10-18 RX ORDER — CEFAZOLIN SODIUM 1 G/3ML
INJECTION, POWDER, FOR SOLUTION INTRAMUSCULAR; INTRAVENOUS PRN
Status: DISCONTINUED | OUTPATIENT
Start: 2022-10-18 | End: 2022-10-18 | Stop reason: SDUPTHER

## 2022-10-18 RX ORDER — PROPOFOL 10 MG/ML
INJECTION, EMULSION INTRAVENOUS PRN
Status: DISCONTINUED | OUTPATIENT
Start: 2022-10-18 | End: 2022-10-18 | Stop reason: SDUPTHER

## 2022-10-18 RX ORDER — BISACODYL 10 MG
10 SUPPOSITORY, RECTAL RECTAL DAILY PRN
Status: DISCONTINUED | OUTPATIENT
Start: 2022-10-18 | End: 2022-10-19 | Stop reason: HOSPADM

## 2022-10-18 RX ORDER — DEXAMETHASONE SODIUM PHOSPHATE 10 MG/ML
INJECTION INTRAMUSCULAR; INTRAVENOUS PRN
Status: DISCONTINUED | OUTPATIENT
Start: 2022-10-18 | End: 2022-10-18 | Stop reason: SDUPTHER

## 2022-10-18 RX ORDER — ONDANSETRON 2 MG/ML
4 INJECTION INTRAMUSCULAR; INTRAVENOUS EVERY 6 HOURS PRN
Status: DISCONTINUED | OUTPATIENT
Start: 2022-10-18 | End: 2022-10-19 | Stop reason: HOSPADM

## 2022-10-18 RX ADMIN — FENTANYL CITRATE 25 MCG: 50 INJECTION, SOLUTION INTRAMUSCULAR; INTRAVENOUS at 13:38

## 2022-10-18 RX ADMIN — PHENYLEPHRINE HYDROCHLORIDE 100 MCG: 10 INJECTION INTRAVENOUS at 10:15

## 2022-10-18 RX ADMIN — ROCURONIUM BROMIDE 20 MG: 10 INJECTION, SOLUTION INTRAVENOUS at 09:24

## 2022-10-18 RX ADMIN — ROCURONIUM BROMIDE 20 MG: 10 INJECTION, SOLUTION INTRAVENOUS at 10:04

## 2022-10-18 RX ADMIN — FENTANYL CITRATE 25 MCG: 50 INJECTION, SOLUTION INTRAMUSCULAR; INTRAVENOUS at 11:35

## 2022-10-18 RX ADMIN — HYDROCHLOROTHIAZIDE 25 MG: 25 TABLET ORAL at 17:44

## 2022-10-18 RX ADMIN — FENTANYL CITRATE 25 MCG: 50 INJECTION, SOLUTION INTRAMUSCULAR; INTRAVENOUS at 08:26

## 2022-10-18 RX ADMIN — HYDROMORPHONE HYDROCHLORIDE 0.5 MG: 1 INJECTION, SOLUTION INTRAMUSCULAR; INTRAVENOUS; SUBCUTANEOUS at 20:22

## 2022-10-18 RX ADMIN — ATORVASTATIN CALCIUM 20 MG: 20 TABLET, FILM COATED ORAL at 17:44

## 2022-10-18 RX ADMIN — PHENYLEPHRINE HYDROCHLORIDE 50 MCG: 10 INJECTION INTRAVENOUS at 09:54

## 2022-10-18 RX ADMIN — ONDANSETRON 4 MG: 2 INJECTION INTRAMUSCULAR; INTRAVENOUS at 10:42

## 2022-10-18 RX ADMIN — SODIUM CHLORIDE: 9 INJECTION, SOLUTION INTRAVENOUS at 17:00

## 2022-10-18 RX ADMIN — PROPOFOL 150 MG: 10 INJECTION, EMULSION INTRAVENOUS at 07:53

## 2022-10-18 RX ADMIN — PROPOFOL 50 MG: 10 INJECTION, EMULSION INTRAVENOUS at 07:56

## 2022-10-18 RX ADMIN — PHENYLEPHRINE HYDROCHLORIDE 50 MCG: 10 INJECTION INTRAVENOUS at 08:58

## 2022-10-18 RX ADMIN — ROCURONIUM BROMIDE 20 MG: 10 INJECTION, SOLUTION INTRAVENOUS at 08:26

## 2022-10-18 RX ADMIN — OXYCODONE 5 MG: 5 TABLET ORAL at 22:17

## 2022-10-18 RX ADMIN — FENTANYL CITRATE 25 MCG: 50 INJECTION, SOLUTION INTRAMUSCULAR; INTRAVENOUS at 10:08

## 2022-10-18 RX ADMIN — SODIUM CHLORIDE, POTASSIUM CHLORIDE, SODIUM LACTATE AND CALCIUM CHLORIDE 1000 ML: 600; 310; 30; 20 INJECTION, SOLUTION INTRAVENOUS at 06:53

## 2022-10-18 RX ADMIN — SODIUM CHLORIDE, POTASSIUM CHLORIDE, SODIUM LACTATE AND CALCIUM CHLORIDE: 600; 310; 30; 20 INJECTION, SOLUTION INTRAVENOUS at 08:00

## 2022-10-18 RX ADMIN — FENTANYL CITRATE 50 MCG: 50 INJECTION, SOLUTION INTRAMUSCULAR; INTRAVENOUS at 08:48

## 2022-10-18 RX ADMIN — FENTANYL CITRATE 100 MCG: 50 INJECTION, SOLUTION INTRAMUSCULAR; INTRAVENOUS at 07:53

## 2022-10-18 RX ADMIN — CEFAZOLIN 2 G: 1 INJECTION, POWDER, FOR SOLUTION INTRAMUSCULAR; INTRAVENOUS at 08:36

## 2022-10-18 RX ADMIN — ACETAMINOPHEN 650 MG: 325 TABLET ORAL at 17:44

## 2022-10-18 RX ADMIN — SUGAMMADEX 200 MG: 100 INJECTION, SOLUTION INTRAVENOUS at 10:50

## 2022-10-18 RX ADMIN — HYDROMORPHONE HYDROCHLORIDE 0.25 MG: 1 INJECTION, SOLUTION INTRAMUSCULAR; INTRAVENOUS; SUBCUTANEOUS at 17:45

## 2022-10-18 RX ADMIN — VENLAFAXINE 37.5 MG: 37.5 TABLET ORAL at 17:45

## 2022-10-18 RX ADMIN — PHENYLEPHRINE HYDROCHLORIDE 100 MCG: 10 INJECTION INTRAVENOUS at 10:26

## 2022-10-18 RX ADMIN — ACETAMINOPHEN 650 MG: 325 TABLET ORAL at 22:17

## 2022-10-18 RX ADMIN — MIDAZOLAM 2 MG: 1 INJECTION INTRAMUSCULAR; INTRAVENOUS at 07:49

## 2022-10-18 RX ADMIN — ROCURONIUM BROMIDE 50 MG: 10 INJECTION, SOLUTION INTRAVENOUS at 07:53

## 2022-10-18 RX ADMIN — SODIUM CHLORIDE, PRESERVATIVE FREE 10 ML: 5 INJECTION INTRAVENOUS at 20:25

## 2022-10-18 RX ADMIN — CEFAZOLIN 2000 MG: 10 INJECTION, POWDER, FOR SOLUTION INTRAVENOUS at 17:44

## 2022-10-18 RX ADMIN — LIDOCAINE HYDROCHLORIDE 50 MG: 10 INJECTION, SOLUTION EPIDURAL; INFILTRATION; INTRACAUDAL; PERINEURAL at 07:53

## 2022-10-18 RX ADMIN — DEXAMETHASONE SODIUM PHOSPHATE 10 MG: 10 INJECTION INTRAMUSCULAR; INTRAVENOUS at 08:10

## 2022-10-18 RX ADMIN — PHENYLEPHRINE HYDROCHLORIDE 100 MCG: 10 INJECTION INTRAVENOUS at 10:01

## 2022-10-18 ASSESSMENT — PAIN - FUNCTIONAL ASSESSMENT
PAIN_FUNCTIONAL_ASSESSMENT: NONE - DENIES PAIN
PAIN_FUNCTIONAL_ASSESSMENT: PREVENTS OR INTERFERES SOME ACTIVE ACTIVITIES AND ADLS
PAIN_FUNCTIONAL_ASSESSMENT: PREVENTS OR INTERFERES SOME ACTIVE ACTIVITIES AND ADLS

## 2022-10-18 ASSESSMENT — PAIN SCALES - GENERAL
PAINLEVEL_OUTOF10: 4
PAINLEVEL_OUTOF10: 5
PAINLEVEL_OUTOF10: 5
PAINLEVEL_OUTOF10: 7
PAINLEVEL_OUTOF10: 4
PAINLEVEL_OUTOF10: 5
PAINLEVEL_OUTOF10: 4
PAINLEVEL_OUTOF10: 5
PAINLEVEL_OUTOF10: 5

## 2022-10-18 ASSESSMENT — PAIN DESCRIPTION - DESCRIPTORS
DESCRIPTORS: SHARP
DESCRIPTORS: SHARP
DESCRIPTORS: ACHING
DESCRIPTORS: ACHING

## 2022-10-18 ASSESSMENT — PAIN DESCRIPTION - LOCATION
LOCATION: NECK

## 2022-10-18 ASSESSMENT — PAIN DESCRIPTION - ORIENTATION
ORIENTATION: ANTERIOR
ORIENTATION: ANTERIOR

## 2022-10-18 NOTE — INTERVAL H&P NOTE
Pt Name: Kimberlee Atkinson  MRN: [id number]  YOB: 1960  Date of evaluation: 10/18/2022    I have reviewed the patient's history and physical examination completed in pre-admission testing.     Changes to history or on examination, if any, are as follows:  none    Jaky Sy PA-C  10/18/22  6:52 AM

## 2022-10-18 NOTE — ANESTHESIA POSTPROCEDURE EVALUATION
Department of Anesthesiology  Postprocedure Note    Patient: Devyn [place]  MRN: [id number]  YOB: 1960  Date of evaluation: 10/18/2022      Procedure Summary     Date: 10/18/22 Room / Location: [address]    Anesthesia Start: 0234 Anesthesia Stop: 1106    Procedure: C6 CERVICAL CORPECTOMY (Neck) Diagnosis:       Cervical stenosis of spine      (CERVICAL STENOSIS WITH MYELOPATHY)    Surgeons: Vijaya Rader DO Responsible Provider: Yue Ramires MD    Anesthesia Type: general ASA Status: 3          Anesthesia Type: No value filed.     Bere Phase I: Bere Score: 9    Bere Phase II:    POST-OP ANESTHESIA NOTE       /70   Pulse 80   Temp (!) 96.1 °F (35.6 °C)   Resp 12   Ht 5' 4\" (1.626 m)   Wt 135 lb (61.2 kg)   SpO2 97%   BMI 23.17 kg/m²    Pain Assessment: None - Denies Pain  Pain Level: 4       Anesthesia Post Evaluation    Patient location during evaluation: PACU  Patient participation: complete - patient participated  Level of consciousness: awake  Pain score: 4  Airway patency: patent  Nausea & Vomiting: no vomiting and no nausea  Complications: no  Cardiovascular status: hemodynamically stable  Hydration status: stable

## 2022-10-18 NOTE — ANESTHESIA PRE PROCEDURE
Department of Anesthesiology  Preprocedure Note       Name:  Louie Yepez   Age:  65 y.o.  :  1960                                          MRN:  [id number]         Date:  10/18/2022      Surgeon: Octaviano Dumont):  Pierre Gold DO    Procedure: Procedure(s):  C6 CERVICAL CORPECTOMY  (SYNTHES, REGULAR TABLE, SUPINE, C-ARM)    Medications prior to admission:   Prior to Admission medications    Medication Sig Start Date End Date Taking?  Authorizing Provider   potassium chloride (KLOR-CON M) 20 MEQ extended release tablet Take 1 tablet by mouth daily 10/6/22   STEPHY Saul CNP   Multiple Vitamins-Minerals (THERAPEUTIC MULTIVITAMIN-MINERALS) tablet Take 1 tablet by mouth daily    Historical Provider, MD   Calcium Carbonate (CALCIUM 500 PO) Take 1 tablet by mouth daily    Historical Provider, MD   vitamin E 100 units capsule Take 100 Units by mouth daily    Historical Provider, MD   Cholecalciferol (VITAMIN D) 50 MCG (2000 UT) CAPS capsule Take 1 capsule by mouth daily    Historical Provider, MD   tiotropium (SPIRIVA RESPIMAT) 2.5 MCG/ACT AERS inhaler Inhale 2 puffs into the lungs daily  Patient not taking: No sig reported 22   STEPHY Saul CNP   tiotropium (SPIRIVA RESPIMAT) 2.5 MCG/ACT AERS inhaler Inhale 2 puffs into the lungs daily  Patient not taking: No sig reported 22   STEPHY Saul CNP   lisinopril (PRINIVIL;ZESTRIL) 10 MG tablet Take 1 tablet by mouth daily 22   STEPHY Saul CNP   hydroCHLOROthiazide (HYDRODIURIL) 25 MG tablet Take 1 tablet by mouth daily 22   STEPHY Saul CNP   atorvastatin (LIPITOR) 20 MG tablet Take 1 tablet by mouth daily 22   STEPHY Saul CNP   venlafaxine [facility]) 37.5 MG tablet Take 1 tablet by mouth daily 22   STEPHY Saul CNP   albuterol sulfate  (90 Base) MCG/ACT inhaler INHALE 2 PUFFS BY MOUTH EVERY SIX HOURS AS NEEDED 4/3/22   Historical Provider, MD       Current medications: Current Facility-Administered Medications   Medication Dose Route Frequency Provider Last Rate Last Admin    lactated ringers infusion 1,000 mL  1,000 mL IntraVENous Continuous Rod Mendoza MD 50 mL/hr at 10/18/22 0653 1,000 mL at 10/18/22 4389       Allergies:  No Known Allergies    Problem List:    Patient Active Problem List   Diagnosis Code    History of abnormal Pap smear Z87.898    Primary hypertension I10    Other hyperlipidemia E78.49    NAFLD (nonalcoholic fatty liver disease) K76.0    Chronic obstructive pulmonary disease ([facility] [address] J44.9    At high risk for breast cancer Z91.89    Personal history of tobacco use Z87.891       Past Medical History:        Diagnosis Date    COPD (chronic obstructive pulmonary disease) ([facility] [address]     Depression     History of abnormal Pap smear     ASCUS (+)HPV    HLD (hyperlipidemia)     Hypertension     Liver disease     Liver mass     Stenosis of cervical spine     Under care of service provider 10/05/2022    pcp-rose veronica-last visit 2022    Under care of service provider 10/05/2022    reese carlos-last visit 2022       Past Surgical History:        Procedure Laterality Date    CARPAL T[facility]NEL [facility]LEASE [facility] 2022    [facility]    COLONOSCOPY      Dr Alek Beatty  2005    HAND SURGERY  2013    right hand, trigger thumb       Social History:    Social History     Tobacco Use    Smoking status: Former     Packs/day: 1.00     Years: 40.00     Pack years: 40.00     Types: Cigarettes     Start date: 1976     Quit date: 2017     Years since quittin.2    Smokeless tobacco: Never   Substance Use Topics    Alcohol use:  Yes     Alcohol/week: 12.0 standard drinks     Types: 2 Glasses of wine, 10 Shots of liquor per week     Comment: Vodka 3 drinks per day                                Counseling given: Not Answered      Vital Signs (Current):   Vitals:    10/18/22 0645   BP: 123/83   Pulse: 80 Resp: 18   Temp: 96.8 °F (36 °C)   TempSrc: Temporal   SpO2: 96%   Weight: 135 lb (61.2 kg)   Height: 5' 4\" (1.626 m)                                              BP Readings from Last 3 Encounters:   10/18/22 123/83   10/05/22 115/79   08/29/22 (!) 151/97       NPO Status: Time of last liquid consumption: 2300                        Time of last solid consumption: 2300                        Date of last liquid consumption: 10/17/22                        Date of last solid food consumption: 10/17/22    BMI:   Wt Readings from Last 3 Encounters:   10/18/22 135 lb (61.2 kg)   10/05/22 134 lb (60.8 kg)   09/26/22 135 lb (61.2 kg)     Body mass index is 23.17 kg/m².     CBC:   Lab Results   Component Value Date/Time    WBC 6.0 10/05/2022 03:23 PM    RBC 4.24 10/05/2022 03:23 PM    HGB 14.8 10/05/2022 03:23 PM    HCT 40.8 10/05/2022 03:23 PM    MCV 96.2 10/05/2022 03:23 PM    RDW 11.8 10/05/2022 03:23 PM     10/05/2022 03:23 PM       CMP:   Lab Results   Component Value Date/Time     10/05/2022 03:23 PM    K 4.2 10/11/2022 11:45 AM    CL 95 10/05/2022 03:23 PM    CO2 24 10/05/2022 03:23 PM    BUN 9 10/05/2022 03:23 PM    CREATININE 0.64 10/05/2022 03:23 PM    GFRAA >60 04/10/2017 01:06 PM    LABGLOM >60 10/05/2022 03:23 PM    GLUCOSE 104 10/05/2022 03:23 PM    PROT 6.9 04/10/2017 01:06 PM    CALCIUM 9.4 04/10/2017 01:06 PM    BILITOT 0.30 04/10/2017 01:06 PM    ALKPHOS 79 04/10/2017 01:06 PM    AST 36 04/10/2017 01:06 PM    ALT 24 04/10/2017 01:06 PM       POC Tests:   Recent Labs     10/18/22  0652   POCK 3.8       Coags:   Lab Results   Component Value Date/Time    PROTIME 9.7 10/05/2022 03:23 PM    INR 0.9 10/05/2022 03:23 PM    APTT 25.2 10/05/2022 03:23 PM       HCG (If Applicable): No results found for: PREGTESTUR, PREGSERUM, HCG, HCGQUANT     ABGs: No results found for: PHART, PO2ART, GWN8GBB, WBT1WAX, BEART, F3MROMRR     Type & Screen (If Applicable):  No results found for: LABABO, [address]    Drug/Infectious Status (If Applicable):  No results found for: HIV, HEPCAB    COVID-19 Screening (If Applicable): No results found for: COVID19        Anesthesia Evaluation    Airway: Mallampati: III  TM distance: >3 FB   Neck ROM: limited  Mouth opening: > = 3 FB   Dental:    (+) other, caps and bridge      Pulmonary:   (+) COPD:  decreased breath sounds                            Cardiovascular:    (+) hypertension:,                   Neuro/Psych:   (+) psychiatric history:depression/anxiety             GI/Hepatic/Renal:   (+) liver disease:,           Endo/Other: Negative Endo/Other ROS                    Abdominal:             Vascular: negative vascular ROS. Other Findings:           Anesthesia Plan      general     ASA 3       Induction: intravenous. arterial line  MIPS: Postoperative opioids intended. Anesthetic plan and risks discussed with patient and spouse. Plan discussed with CRNA.                     Nika Felix MD   10/18/2022

## 2022-10-18 NOTE — PROGRESS NOTES
Neurosurgery Post op Progress Note      POD# 0 s/p C6 cervical corpectomy    SUBJECTIVE:    Originally presented with mild bilateral arm numbness. Patient went for C6 cervical corpectomy and repair. Patient tolerated the surgery well. Patient was evaluated at bedside postoperatively. Patient not complaining of any new numbness or tingling. States that her symptoms have remained however states that she believes there has been some mild improvement in the distribution of her numbness. Patient denies any changes in her mental status, states that she feels very well and is very comfortable. OBJECTIVE    Physical exam   Vitals:    10/18/22 1338   BP:    Pulse:    Resp: 18   Temp:    SpO2:        Awake and AOX4. Normal speech. Comprehension was normal. Follows simple commands. Open eyes spontaneously . GCS 15/15. PERRLA +3, EOM intact. CN 2-12 Intact . Cranial Nerves:    II: Visual acuity: normal . Visual fields:  normal  III: Pupils:  equal, round, reactive to light . No ptosis   III,IV,VI: Extra Ocular Movements: intact  V: Facial sensation: intact , positive Corneal reflex   VII: Facial strength: Normal . No droop . positive Corneal reflex   VIII: Hearing:  intact  IX: Palate: intact . Positive gag reflex   XI: Shrug shoulders and turn head  against resistance  XII: Tongue movement:  normal     Muscle strength  RUE 5 /5  RLE  5/5  LUE  5/5   LLE 5/5 . Normal range of motion of joints . Coordination is normal. No involuntary movements or tremors. DTR 2+ throughout, Plantar reflexes were downgoing bilaterally. Sensations Intact      Wound   Post op wound:  anterior cervical spine   clean   Wound closed with sutures and surgical glue  Dressing is clean/dry/intact with no signs of drainage     ASSESSMENT AND PLAN  65 y.o. female is post op day # 0 s/p C6 corpectomy. Patient is comfortable with mildly improvement of the distribution of her numbness.   Patient will be observed, reassessed at bedside by attending physician. We will have the patient eat. He does not maintain the cervical collar and given appropriate analgesia and PT/OT as needed. - Keep Head Of Bed [facility]) greater than 30°  - Hold all antiplatelets and anticoagulants  - Activity: As tolerated  - SCD sleeves   - Advance as tolerated  - Continue Neuro assessment per unit protocol.   - Encourage use of IS frequently  - Continue surgical wound  Care and  Change surgical dressing as needed .        Mauri Baxter MD  Emergency Medicine Resident  Neuro Critical Care and Neurosurgery Service

## 2022-10-18 NOTE — PROGRESS NOTES
PT walked to br no problems and voided clear yellow urine 100ml no problems, walked back to bed voices no c/o

## 2022-10-18 NOTE — CARE COORDINATION
10/18/22 1729   Service Assessment   Patient Orientation Alert and Oriented   Cognition Alert   History Provided By Patient   Primary Caregiver Self   Accompanied By/Relationship spouse- [address] Spouse/Significant Other   Patient's Healthcare Decision Maker is: Legal Next of Kin   PCP Verified by CM Yes   Last Visit to PCP Within last 3 months   Prior Functional Level Independent in ADLs/IADLs   Current Functional Level Independent in ADLs/IADLs   Can patient return to prior living arrangement Yes   Ability to make needs known: Good   Family able to assist with home care needs: Yes   Would you like for me to discuss the discharge plan with any other family members/significant others, and if so, who? No   Social/Functional History   Lives With Spouse   Type of [address] Two level   Home Access Stairs to enter without rails   Bathroom Shower/Tub Tub/Shower unit   Bathroom Toilet Standard   Bathroom Accessibility Accessible   ADL Assistance Independent   Homemaking Assistance Independent   Homemaking Responsibilities Yes   Ambulation Assistance Independent   Transfer Assistance Independent   Active  Yes   Mode of Transportation Car   Occupation Unemployed   Discharge Planning   Type of [address] Prior To Admission None   Potential Assistance Needed N/A   DME Ordered? No   Potential Assistance Purchasing Medications No   Type of Home Care Services None   Patient expects to be discharged to: House   One/Two Story Residence Two story   Lift Chair Available No   History of falls? 0   Services At/After Discharge   Services At/After Discharge None   [employer] Information Provided?  No   Mode of Transport at Discharge Other (see comment)   Confirm Follow Up Transport Family   Condition of Participation: Discharge Planning   The Plan for Transition of Care is related to the following treatment goals: Goal to return home with    The Patient and/or Patient Representative was provided with a Choice of Provider? Patient   The Patient and/Or Patient Representative agree with the Discharge Plan? Yes   Freedom of Choice list was provided with basic dialogue that supports the patient's individualized plan of care/goals, treatment preferences, and shares the quality data associated with the providers? Yes   CM spoke with patient at bedside to discuss transitional planning. CM role explained and patient demographic information verified. Patient plans to return home with her  at discharge and will have a ride. Await PT/OT evals.

## 2022-10-18 NOTE — OP NOTE
Operative Note      Patient: Alisha Palma  YOB: 1960  MRN: [id number]    Date of Procedure: 10/18/2022    Pre-Op Diagnosis: CERVICAL STENOSIS WITH MYELOPATHY    Post-Op Diagnosis: Same       Indications for procedure. Patient with severe stenosis at C5-6 and C6-7 along with significant mid body stenosis. Also with significant stenosis just above the endplate of C5 and C7 requiring complete corpectomy for appropriate angulation and decompression. Patient clinical signs and symptoms of myelopathy consistent with significant stenosis and cord signal change on MRI    Procedure  C6 corpectomy with C5-6 and C6-7 intracervical discectomy and osteophytectomy, anterior arthrodesis at C5 and C7  Implantation of DePuy peek corpectomy cage  Anterior fixation with titanium plate and 60PG screws  Use of operative microscope  Use of morselized allograft  Use of morselized autograft  Use of fluoroscopy    Surgeon(s):  Tanmay Jensen DO    Assistant:   First Assistant: Rachel Ang RN    Anesthesia: General    Estimated Blood Loss (mL): less than 50     Complications: None    Specimens:   * No specimens in log *    Implants:  Implant Name Type Inv.  Item Serial No.  Lot No. LRB No. Used Action   GRAFT BNE SUB SM 1ML CRYOPRESERVED VIABLE PATTI CANC BNE - [id number]  GRAFT BNE SUB SM 1ML CRYOPRESERVED VIABLE PATTI CANC BNE [id number] [facility] T[facility]UE [facility]-WD [de-identified] N/A 1 Implanted   CAGE SPNL [id number] 6DEG STD CERV THORLUM C FBR POLYMER - [id number]  CAGE SPNL [id number] 6DEG STD CERV THORLUM C FBR POLYMER  [facility] DEPUY SYNTHES SPINE-WD  N/A 1 Implanted   PLATE SPNL F22KG ANT CERV LEV 1 CARLO SKYLINE - [id number]  PLATE SPNL A96EG ANT CERV LEV 1 CARLO SKYLINE  JN DEPUY SYNTHES SPINE-WD  N/A 1 Implanted   SCREW SPNL L16MM DIA4MM ANT CERV TI SELF DRL CHRIS ANG FULL - [id number]  SCREW SPNL L16MM DIA4MM ANT CERV TI SELF DRL CHRIS ANG FULL  JN DEPUY SYNTHES SPINE-WD  N/A 4 Implanted Drains:   Closed/Suction Drain Anterior;Midline;Right Neck Bulb (Active)   Site Description Clean, dry & intact 10/18/22 1400   Dressing Status Other (Comment) 10/18/22 1400   Drainage Appearance Bloody 10/18/22 1400   Drain Status To bulb suction 10/18/22 1400       Findings: Good reconstruction      Detailed Description of Procedure:   Patient was consented preoperatively. She was brought into the operative room and placed under general anesthesia. She was placed supine on a regular low pressure points padded and slight shoulder bump were slight extension. Shoulders were taped caudad after all pressure points were padded and the arms were tucked. Anterior cervical region was sterile cleansed and neck creases were marked out. After sterile prepping draping and timeout was performed fluoroscopy was identified the mid C6 body trajectory. This line was made with a marker. Incision was infiltrated with onset lidocaine with epinephrine. 10 blade is used to perform incision followed Bovie to take down the subcutaneous layer. Self-retaining retractor was then placed. Metzenbaum scissors and pickups were used to undermine the longus coli and sectioned using Bovie cautery. We later was then advanced. Blunt and sharp dissection were performed medial to the sternocleidomastoid sweeping the carotid sheath laterally. Esophagus was identified and swept medially. Prevertebral fascia was bluntly dissected using Metzenbaum scissors and [place]. Presumed C5-6 disc space was confirmed under lateral fluoroscopy using a bent spinal needle. Bovie and suction were then used to remove the AL L from mid C5 to mid C7 undermining both longus coli while the assistant held the Cloward retractor and medial structures.  retractor was then placed medial lateral and cephalad caudad. Operative microscope was then brought into the field.   Under microscopic guidance 15 blade is used to perform an annulotomy followed by high-speed bur to drill to remove the superior and inferior endplates of the C6 body all the way to the posterior margin drilling down the posterior osteophytes. 2 curette was then used to penetrate the pll at C5-6 and 2 punch was used to circumferentially resect the posterior longitudinal ligament along with disc and osteophyte circumferentially at C5-6. In similar fashion complete discectomy and decompression was performed at C6-7. High-speed bur was then used to drill down the gutters medial to the longus coli bilaterally. [place]ur was then used to remove the C6 body en bloc using a rongeur and a twisting fashion. The posterior longitudinal ligament which was residual was gently dissected off of the dura using microsurgical technique including a small upgoing curette and [place] under the microscope. This was gently peeled off and resected using a 2 punch. Superior endplate of C7 and inferior endplate of C5 were cleared posteriorly with a 2 curette and the posterior osteophyte and residual PLL were then removed using a 2 punch. After complete thorough circumferential decompression DePuy peek cage was placed with anesthesia providing slight traction of the head. After appropriately sizing the cage was then filled with morselized autograft from the C6 to body as well as small amount of DuraGen. This was then implanted while anesthesia pull traction on the head. The cage was slightly countersunk. Anterior overhang was told C5 and C7 and anterior fixation with a titanium plate and 54HD screws convergent ambulate the respective endplates was then performed. Final tightening of all screws was then performed. The wound was thoroughly irrigated with multiple rounds of saline Floseal Gelfoam used obtain hemostasis along with bipolar cautery. A 7 flat SHAWNA drain was tunneled below the platysma and secured with drain stitch.   The wound was reapproximated using multiple 3-0 Vicryl erupted for the platysma followed by running 4-0 Monocryl for the skin along with Dermabond.   Patient was then awoken in stable condition and returned to the PACU    Electronically signed by Carmen Vazquez DO on 10/18/2022 at 5:56 PM

## 2022-10-19 ENCOUNTER — TELEPHONE (OUTPATIENT)
Dept: FAMILY MEDICINE CLINIC | Age: 62
End: 2022-10-19

## 2022-10-19 VITALS
TEMPERATURE: 98.4 F | SYSTOLIC BLOOD PRESSURE: 109 MMHG | RESPIRATION RATE: 11 BRPM | DIASTOLIC BLOOD PRESSURE: 77 MMHG | HEIGHT: 64 IN | WEIGHT: 135 LBS | BODY MASS INDEX: 23.05 KG/M2 | HEART RATE: 75 BPM | OXYGEN SATURATION: 94 %

## 2022-10-19 LAB
HCT VFR BLD CALC: 35.6 % (ref 36.3–47.1)
HEMOGLOBIN: 12.1 G/DL (ref 11.9–15.1)
MCH RBC QN AUTO: 33.8 PG (ref 25.2–33.5)
MCHC RBC AUTO-ENTMCNC: 34 G/DL (ref 28.4–34.8)
MCV RBC AUTO: 99.4 FL (ref 82.6–102.9)
NRBC AUTOMATED: 0.2 PER 100 WBC
PDW BLD-RTO: 11.9 % (ref 11.8–14.4)
PLATELET # BLD: 272 K/UL (ref 138–453)
PMV BLD AUTO: 9.8 FL (ref 8.1–13.5)
RBC # BLD: 3.58 M/UL (ref 3.95–5.11)
WBC # BLD: 10.8 K/UL (ref 3.5–11.3)

## 2022-10-19 PROCEDURE — 6360000002 HC RX W HCPCS: Performed by: REGISTERED NURSE

## 2022-10-19 PROCEDURE — 6370000000 HC RX 637 (ALT 250 FOR IP): Performed by: REGISTERED NURSE

## 2022-10-19 PROCEDURE — 2500000003 HC RX 250 WO HCPCS: Performed by: REGISTERED NURSE

## 2022-10-19 PROCEDURE — APPSS30 APP SPLIT SHARED TIME 16-30 MINUTES: Performed by: NURSE PRACTITIONER

## 2022-10-19 PROCEDURE — 2580000003 HC RX 258: Performed by: REGISTERED NURSE

## 2022-10-19 PROCEDURE — 36415 COLL VENOUS BLD VENIPUNCTURE: CPT

## 2022-10-19 PROCEDURE — 85027 COMPLETE CBC AUTOMATED: CPT

## 2022-10-19 RX ORDER — CYCLOBENZAPRINE HCL 10 MG
10 TABLET ORAL 3 TIMES DAILY PRN
Qty: 30 TABLET | Refills: 0 | Status: SHIPPED | OUTPATIENT
Start: 2022-10-19 | End: 2022-10-29

## 2022-10-19 RX ORDER — OXYCODONE HYDROCHLORIDE 5 MG/1
5-10 TABLET ORAL EVERY 6 HOURS PRN
Qty: 56 TABLET | Refills: 0 | Status: SHIPPED | OUTPATIENT
Start: 2022-10-19 | End: 2022-10-26

## 2022-10-19 RX ADMIN — LISINOPRIL 10 MG: 10 TABLET ORAL at 09:19

## 2022-10-19 RX ADMIN — HYDROCHLOROTHIAZIDE 25 MG: 25 TABLET ORAL at 09:19

## 2022-10-19 RX ADMIN — DOCUSATE SODIUM 50 MG AND SENNOSIDES 8.6 MG 1 TABLET: 8.6; 5 TABLET, FILM COATED ORAL at 09:19

## 2022-10-19 RX ADMIN — VENLAFAXINE 37.5 MG: 37.5 TABLET ORAL at 09:19

## 2022-10-19 RX ADMIN — CEFAZOLIN 2000 MG: 10 INJECTION, POWDER, FOR SOLUTION INTRAVENOUS at 09:19

## 2022-10-19 RX ADMIN — CYCLOBENZAPRINE 10 MG: 10 TABLET, FILM COATED ORAL at 09:23

## 2022-10-19 RX ADMIN — SODIUM CHLORIDE, PRESERVATIVE FREE 10 ML: 5 INJECTION INTRAVENOUS at 09:19

## 2022-10-19 RX ADMIN — SODIUM CHLORIDE: 9 INJECTION, SOLUTION INTRAVENOUS at 06:53

## 2022-10-19 RX ADMIN — OXYCODONE 5 MG: 5 TABLET ORAL at 03:58

## 2022-10-19 RX ADMIN — HYDROMORPHONE HYDROCHLORIDE 0.25 MG: 1 INJECTION, SOLUTION INTRAMUSCULAR; INTRAVENOUS; SUBCUTANEOUS at 00:43

## 2022-10-19 RX ADMIN — CEFAZOLIN 2000 MG: 10 INJECTION, POWDER, FOR SOLUTION INTRAVENOUS at 00:53

## 2022-10-19 RX ADMIN — ACETAMINOPHEN 650 MG: 325 TABLET ORAL at 03:58

## 2022-10-19 RX ADMIN — ATORVASTATIN CALCIUM 20 MG: 20 TABLET, FILM COATED ORAL at 09:19

## 2022-10-19 ASSESSMENT — PAIN SCALES - GENERAL
PAINLEVEL_OUTOF10: 0
PAINLEVEL_OUTOF10: 4
PAINLEVEL_OUTOF10: 4

## 2022-10-19 NOTE — TELEPHONE ENCOUNTER
[facility] Transitions Initial Follow Up Call    Outreach made within 2 business days of discharge: Yes    Patient: Sebastián Woo Patient : 1960   MRN: [id number]  Reason for Admission: There are no discharge diagnoses documented for the most recent discharge. Discharge Date: 10/19/22       Spoke with: patient will follow up with surg. Discharge department/facility: [address] Interactive Patient Contact:  Was patient able to fill all prescriptions: Yes  Was patient instructed to bring all medications to the follow-up visit: Yes  Is patient taking all medications as directed in the discharge summary?  Yes  Does patient understand their discharge instructions: Yes  Does patient have questions or concerns that need addressed prior to 7-14 day follow up office visit: no    Scheduled appointment with PCP within 7-14 days    Follow Up  Future Appointments   Date Time Provider Willi Damon   2022 10:00 AM STEPHY Taylor CNP [facility]   2022 10:00 AM DO Azar Stanford [facility]       Dianne Boeck

## 2022-10-19 NOTE — PLAN OF CARE
Drain Removal Note    Drain removed from suction. Drain suture cut and drain removed. Occlusive dressing placed over drain hole with hemostasis. No complications, patient tolerated procedure well.     --  Juanita Ervin CNP  8:40 AM EDT

## 2022-10-19 NOTE — DISCHARGE INSTRUCTIONS
Anterior Cervical Surgery Discharge Instructions     Thank you for choosing [facility] and [facility] for your surgical needs. The following instructions will help to ensure your comfort and that you are well prepared after your surgery. Post-Operative Visit:   The office is located at:    [address], [address]    [facility], [address] [facility], main floor    [facility], [address] [place] [address] Street    [phone]     Please also call your primary care physician to schedule an appointment for further evaluation and care. Nutrition:   You may resume your regular diet. It is normal to have a sore throat and some difficulty swallowing solid foods. You are able to resume your regular diet, however you may be more comfortable at first eating softer foods such as mashed potatoes, ice cream, jello or soup. Be sure to eat a well-balanced diet. Protein promotes wound healing. Pain medication and decreased activity can cause constipation. Drink 8-10 glasses of water a day, eat fresh fruits and vegetables, and add prunes, raisins and bran cereals to your diet if you do become constipated. A stool softener taken 1-2 times a day is helpful. Dulcolax suppositories or Fleets enemas are also available without a prescription. Call our office if the problem continues. Activity and Exercise:   No driving until you are seen in the office. Avoid riding in a car for the first two weeks until you come to the office for your scheduled follow-up. Start taking short, frequent walks in the beginning. [place], more frequent walks throughout the day are more beneficial than one long walk each day. You may gradually increase the distance; as tolerated. Your brace will help give support to your muscles while you walk. If your pain increases, you may be walking too much or too far. Try backing off for a day or two and then resume slowly.    No lifting greater than 5 lbs (gallon of milk). No pushing, pulling, or overhead work. If physical therapy has been prescribed, you are not to perform range of motion, flexion, extension or lateral bending. No baths, swimming or hot tub until you discuss this with your doctor. Brace:   Please wear cervical collar while out of bed or any time sitting upright. Incision Care and Hygiene: Your incision may be may be closed with sutures, steri-strips, staples, or glue. - The Steri-strips will fall off on their own in 7-10 days    - The staples or sutures should be removed about 2 weeks after surgery. If they are not removed please call the clinic to have them removed. - The glue will dissolve over time   No ointments, creams, or lotions on the incision   It is OK to shower 3-4 days after surgery. Let water run over the incision. Gently pat the incision dry with a clean towel, do not rub. Leave incision site open to air. Pain Management:   Do not take NSAID medications (Ibuprofen, Naprosyn, etc.) or Wilkerson-2 inhibitors (Celebrex, etc.) for 10  weeks following surgery. You will be given a prescription for pain medication. Our hope is that you will eventually be weaned off all pain medications. Try not to take the pain medicine unless you need to. If you feel that you do not need something that strong, you may use regular or extra-strength Tylenol instead. DO NOT drink alcohol, drive or operate heavy machinery while taking your pain medications. Notify the office if your pain is not controlled or you need a medication refill before your appointment. Blood Thinning Medication:  If you were previously on any blood thinning medications (Clopidogrel, Warfarin, aspirin, etc.) that havent been restarted, you may resume in 1  weeks following your surgery.      YOU SHOULD CALL THE OFFICE -[phone] IF YOU HAVE ANY OF THE FOLLOWING:   Increased pain or pain not relieved with current medications   Increased difficulty with swallowing   If you notice any signs of infection such as bleeding, redness, swelling, tenderness, odor, drainage or opening of the incision. Please check your incisions twice daily. Fevers greater than 101.5 degrees   Flu-like symptoms, chills, shakes, chest pain, shortness of breath, nausea, vomiting, diarrhea   New or increased pain, numbness or tingling in the arms or legs, as well as new or increased balance or coordination issues. New difficulty with urinating or holding your bladder or your bowels     *If you are unable to contact someone at the office and your symptoms persist or increase, call 911 or go to the emergency department.

## 2022-10-19 NOTE — PLAN OF CARE
Problem: Discharge Planning  Goal: Discharge to home or other facility with appropriate resources  10/19/2022 1049 by Raman Conroy RN  Outcome: Completed  10/19/2022 1028 by Raman Conroy RN  Outcome: Progressing  10/19/2022 0519 by Debbie Squires RN  Outcome: Progressing  10/19/2022 0518 by Debbie Squires RN  Outcome: Progressing     Problem: Pain  Goal: Verbalizes/displays adequate comfort level or baseline comfort level  10/19/2022 1049 by Raman Conroy RN  Outcome: Completed  10/19/2022 1028 by Raman Conroy RN  Outcome: Progressing  10/19/2022 0519 by Debbie Squires RN  Outcome: Progressing  10/19/2022 0518 by Debbie Squires RN  Outcome: Progressing

## 2022-10-19 NOTE — PROGRESS NOTES
Patient given discharge instructions regarding changes in medications, follow up appointments, and plan of care. Patient had no additional questions at this time. Meds to beds delivered. PIV removed. Patient discharged off unit with staff and .

## 2022-10-19 NOTE — PROGRESS NOTES
Neurosurgery LILY/Resident    Daily Progress Note     Cervical stenosis with myelopathy; POD#1 C6 corpectomy    10/19/2022  9:54 AM    Chart reviewed. No acute events overnight. No new complaints. Patient doing well. Denies dysphagia, sore throat. Postop pain well controlled. Has been up ambulating in the room. Voiding. Tolerating PO. Vitals:    10/18/22 1745 10/18/22 1954 10/19/22 0400 10/19/22 0743   BP: 121/85 112/76 110/73 109/77   Pulse: 74  60 75   Resp: 15 15 12 11   Temp:  97.6 °F (36.4 °C) 97.8 °F (36.6 °C) 98.4 °F (36.9 °C)   TempSrc:  Oral Oral Oral   SpO2: 97% 94%  94%   Weight:       Height:             PE: AOx3   CNII-XII intact   PERRL, EOMI     Motor  L deltoid 5/5; R deltoid 5/5  L biceps 5/5; R biceps 5/5  L triceps 5/5; R triceps 5/5  L wrist extension 5/5; R wrist extension 5/5  L intrinsics 5/5; R intrinsics 5/5      L iliopsoas 5/5 , R iliopsoas 5/5  L quadriceps 5/5; R quadriceps 5/5  L Dorsiflexion 5/5; R dorsiflexion 5/5  L Plantarflexion 5/5; R plantarflexion 5/5  L EHL 5/5; R EHL 5/5    Sensation: Decreased bilateral hands    Drain output: 10mL/12hr  Incision: CDI      Lab Results   Component Value Date    WBC 10.8 10/19/2022    HGB 12.1 10/19/2022    HCT 35.6 (L) 10/19/2022     10/19/2022    CHOL 255 (H) 06/27/2022    TRIG 169 (H) 06/27/2022    HDL 89 06/27/2022    ALT 24 04/10/2017    AST 36 (H) 04/10/2017     10/05/2022    K 4.2 10/11/2022    CL 95 (L) 10/05/2022    CREATININE 0.64 10/05/2022    BUN 9 10/05/2022    CO2 24 10/05/2022    TSH 1.37 04/10/2017    INR 0.9 10/05/2022    LABA1C 5.8 06/27/2022       Radiology   XR CERVICAL SPINE (2-3 VIEWS)    Result Date: 10/18/2022  EXAMINATION: 2 XRAY VIEWS OF THE CERVICAL SPINE 10/18/2022 6:05 pm COMPARISON: 08/19/2022.  HISTORY: ORDERING SYSTEM PROVIDED HISTORY: f/u post op TECHNOLOGIST PROVIDED HISTORY: f/u post op FINDINGS: The patient has undergone interval C6 corpectomy and interval placement the anterior orthopedic plate and screws entering C5 and C7 level. There is 2-3 mm grade 1 anterolisthesis of C3 on C4 and C4 on C5. There is straightening of normal cervical lordosis. There is prominence of the prevertebral soft tissues, presumably postsurgical in origin. Postsurgical changes of interval C6 corpectomy/orthopedic fixation/fusion of the C5-C7 levels. Grade 1 anterolisthesis of C3 on C4 and C4 on C5. Straightening of normal cervical lordosis. A/P  65 y.o. female who presents with cervical stenosis with myelopathy; POD#1 C6 corpectomy. - Imaging: Postop x-rays complete   - Encourage incentive spirometer   - PT/OT  - Encourage mobilization  - DVT prophylaxis: SCDs  - Analgesia: Tylenol; PRN: Flexeril, oxycodone  - Diet: as tolerated   - Neuro checks per floor protocol  - Drain discontinued  - Cervical collar when out of bed, okay to remove to sleep, eat and for hygiene  - Plan for discharge home later today      Please contact neurosurgery with any changes in patients neurologic status.        STEPHY Diaz-CNP  10/19/2022  9:54 AM

## 2022-10-19 NOTE — CARE COORDINATION
Discharge 1 [facility] Case Management Department  Written by: Devin Ferreira RN    Patient Name: Erwin Grier  Attending Provider: Chuy Mancilla DO  Admit Date: 10/18/2022  6:04 AM  MRN: [id number]  Account: [de-identified]                     : 1960  Discharge Date: 10/19/22      Disposition: home    Devin Ferreira RN

## 2022-10-19 NOTE — PROGRESS NOTES
SPIRITUAL CARE DEPARTMENT - [facility] [address]  PROGRESS NOTE    Shift date: 10/18/22  Shift day: Tuesday   Shift # 2    Room # 0136/0136-01   Name: Jayesh Valencia                Tenriism:    Place of Methodist:     Referral: Routine Visit    Admit Date & Time: 10/18/2022  6:04 AM    Assessment:  Jayesh Valencia is a 65 y.o. female     Intervention:  Writer introduced self and title as . Patient appeared receptive to  presence and discussed her surgery from earlier today. Writer offered space for patient  to express feelings, needs, and concerns and provided a ministry presence. Patient appeared calm, coping, and hopeful to be leaving the hospital soon. Patient stated she has people praying for her and appears to draw on the strength of her karel. Outcome:  Patient expressed gratitude for  visit. Plan:  Chaplains will remain available to offer spiritual and emotional support as needed.       Electronically signed by Brianna Ortega on 10/18/2022 at 8:51 PM.  Feng Nascimento  [phone]

## 2022-10-19 NOTE — PLAN OF CARE
Problem: Discharge Planning  Goal: Discharge to home or other facility with appropriate resources  10/19/2022 1028 by Demetris Altamirano RN  Outcome: Progressing  10/19/2022 0519 by Shelia Dangelo RN  Outcome: Progressing  10/19/2022 0518 by Shelia Dangelo RN  Outcome: Progressing     Problem: Pain  Goal: Verbalizes/displays adequate comfort level or baseline comfort level  10/19/2022 1028 by Demetris Altamirano RN  Outcome: Progressing  10/19/2022 0519 by Shelia Dangelo RN  Outcome: Progressing  10/19/2022 0518 by Shelia Dangelo RN  Outcome: Progressing

## 2022-10-19 NOTE — PLAN OF CARE
Problem: Discharge Planning  Goal: Discharge to home or other facility with appropriate resources  10/19/2022 0519 by Cailin Rios RN  Outcome: Progressing  10/19/2022 0518 by Cailin Rios RN  Outcome: Progressing     Problem: Pain  Goal: Verbalizes/displays adequate comfort level or baseline comfort level  10/19/2022 0519 by Cailin Rios RN  Outcome: Progressing  10/19/2022 0518 by Cailin Rios RN  Outcome: Progressing

## 2022-10-19 NOTE — PROGRESS NOTES
CLINICAL PHARMACY NOTE: MEDS TO BEDS    Total # of Prescriptions Filled: 2   The following medications were delivered to the patient:  Cyclobenzaprine 10 mg tab  Oxycodone 5 mg tab    Additional Documentation:

## 2022-10-20 NOTE — DISCHARGE SUMMARY
Department of Neurosurgery                                            Discharge Summary       PATIENT NAME: Chema Stapleton  YOB: 1960  MEDICAL RECORD NO. [id number]  DATE: 10/20/2022  PRIMARY CARE PHYSICIAN: STEPHY LEE - CNP  DISCHARGE DATE:  10/19/2022 11:25 AM  DISCHARGE DIAGNOSIS:   Patient Active Problem List   Diagnosis Code    History of abnormal Pap smear Z87.898    Primary hypertension I10    Other hyperlipidemia E78.49    NAFLD (nonalcoholic fatty liver disease) K76.0    Chronic obstructive pulmonary disease (HCC) J44.9    At high risk for breast cancer Z91.89    Personal history of tobacco use Z87.891    Stenosis of cervical spine with myelopathy (HCC) M48.02, G99.2     DISPOSITION: Home    PROCEDURES:    C6 corpectomy    HOSPITAL COURSE     Chema Stapleton originally presented to the hospital on 10/18/2022  6:04 AM with cervical stenosis with myelopathy. She was admitted and taken to the OR for neurosurgery for procedure listed above. Patient's drain was removed when found to have low output without complications. Patient tolerated all procedures well. Labs and imaging were followed daily. At time of discharge, Chema Stapleton was tolerating a No diet orders on file, having bowel movements, ambulating on her own accord and had adequate analgesia on oral pain medications, and had no signs of symptoms of complications. She is medically stable to be discharged. PHYSICAL EXAMINATION        Discharge Vitals:  height is 5' 4\" (1.626 m) and weight is 135 lb (61.2 kg). Her oral temperature is 98.4 °F (36.9 °C). Her blood pressure is 109/77 and her pulse is 75. Her respiration is 11 and oxygen saturation is 94%.      AOx3   CNII-XII intact   PERRL, EOMI   CVS: normal s2/s1  Respiratory: equal air entry bilaterally   Abdomen: soft, no rigidity  Motor and sensory intact all over    LABS     Recent Labs     10/19/22  0759   WBC 10.8   HGB 12.1   HCT 35.6*    DISCHARGE INSTRUCTIONS     Discharge Medications:        Medication List        START taking these medications      cyclobenzaprine 10 MG tablet  Commonly known as: FLEXERIL  Take 1 tablet by mouth 3 times daily as needed for Muscle spasms     oxyCODONE 5 MG immediate release tablet  Commonly known as: ROXICODONE  Take 1-2 tablets by mouth every 6 hours as needed for Pain for up to 7 days. CONTINUE taking these medications      albuterol sulfate  (90 Base) MCG/ACT inhaler  Commonly known as: PROVENTIL;VENTOLIN;PROAIR     atorvastatin 20 MG tablet  Commonly known as: LIPITOR  Take 1 tablet by mouth daily     CALCIUM 500 PO     hydroCHLOROthiazide 25 MG tablet  Commonly known as: HYDRODIURIL  Take 1 tablet by mouth daily     lisinopril 10 MG tablet  Commonly known as: PRINIVIL;ZESTRIL  Take 1 tablet by mouth daily     potassium chloride 20 MEQ extended release tablet  Commonly known as: KLOR-CON M  Take 1 tablet by mouth daily     therapeutic multivitamin-minerals tablet     venlafaxine 37.5 MG tablet  Commonly known as: EFFEXOR  Take 1 tablet by mouth daily     vitamin D 50 MCG (2000 UT) Caps capsule     vitamin E 100 units capsule            ASK your doctor about these medications      * tiotropium 2.5 MCG/ACT Aers inhaler  Commonly known as: Spiriva Respimat  Inhale 2 puffs into the lungs daily     * tiotropium 2.5 MCG/ACT Aers inhaler  Commonly known as: Spiriva Respimat  Inhale 2 puffs into the lungs daily           * This list has 2 medication(s) that are the same as other medications prescribed for you. Read the directions carefully, and ask your doctor or other care provider to review them with you.                    Where to Get Your Medications        These medications were sent to [facility] [address] [place] [address]  [address] [facility], [place] [address]      Phone: [phone]   cyclobenzaprine 10 MG tablet  oxyCODONE 5 MG immediate release tablet       Diet: No diet orders on file diet as tolerated  Activity: Provided in AVS  Wound Care: Daily and as needed  Follow-up:  in the [facility] clinic as shown in AVS   Time Spent for discharge: 30 minutes    STEPHY Dior CNP  10/20/2022, 7:41 AM

## 2022-10-27 ENCOUNTER — TELEPHONE (OUTPATIENT)
Dept: NEUROSURGERY | Age: 62
End: 2022-10-27

## 2022-11-01 ENCOUNTER — OFFICE VISIT (OUTPATIENT)
Dept: NEUROSURGERY | Age: 62
End: 2022-11-01

## 2022-11-01 VITALS
OXYGEN SATURATION: 96 % | WEIGHT: 135 LBS | TEMPERATURE: 97.4 F | HEIGHT: 64 IN | DIASTOLIC BLOOD PRESSURE: 84 MMHG | BODY MASS INDEX: 23.05 KG/M2 | HEART RATE: 88 BPM | SYSTOLIC BLOOD PRESSURE: 128 MMHG

## 2022-11-01 DIAGNOSIS — Z98.1 S/P CERVICAL SPINAL FUSION: ICD-10-CM

## 2022-11-01 DIAGNOSIS — G99.2 STENOSIS OF CERVICAL SPINE WITH MYELOPATHY (HCC): Primary | ICD-10-CM

## 2022-11-01 DIAGNOSIS — M48.02 STENOSIS OF CERVICAL SPINE WITH MYELOPATHY (HCC): Primary | ICD-10-CM

## 2022-11-01 PROCEDURE — 99024 POSTOP FOLLOW-UP VISIT: CPT | Performed by: NURSE PRACTITIONER

## 2022-11-01 NOTE — PROGRESS NOTES
[address]  [facility] [address] [facility] [place] [address]  Dept: [phone]    Patient:  Lluvia Smyth  YOB: 1960  Date: 11/1/22    The patient is a 65 y.o. female who presents today for consult of the following problems:     Chief Complaint   Patient presents with    Other         HPI:     Lluvia Smyth is a 65 y.o. female who presents to the office for post-op evaluation s/p C6 corpectomy. Patient overall doing very well. Minimal postop pain, still utilizing muscle relaxer. Denies any issues swallowing. Incision healing well. Still with some numbness and tingling to both hands. Has been compliant with cervical collar. Did receive said regarding bone stimulator, declined. Sensation intact-with exception of bilateral hands  Strength 5/5  Incision CDI    Date of surgery: 10/18/2022    Assessment and Plan:      1. Stenosis of cervical spine with myelopathy ([facility] [address]    2. S/P cervical spinal fusion          Plan: Patient doing very well postoperatively. Reassurance provided that gradual improvement to numbness and tingling will take time. Cervical collar adjusted for proper fit. Education provided regarding bone stimulator, patient plans to reach out to obtain this. Referral provided for physical therapy. To return in 6 weeks for next postop visit, upright x-rays prior. Followup: Return in about 6 weeks (around 12/13/2022), or if symptoms worsen or fail to improve. Prescriptions Ordered:  No orders of the defined types were placed in this encounter.        Orders Placed:  Orders Placed This Encounter   Procedures    XR CERVICAL SPINE (2-3 VIEWS)     Standing Status:   Future     Standing Expiration Date:   11/1/2023     Scheduling Instructions:      Standing AP/lateral     Order Specific Question:   Reason for exam:     Answer:   post-op fusion    [facility] [place]     Referral Priority:   Routine     Referral Type:   Eval and Treat     Referral Reason:   Specialty Services Required     Requested Specialty:   Physical Therapist     Number of Visits Requested:   1          Electronically signed by STEPHY Zuleta CNP on 11/1/2022 at 10:41 AM    Please note that this chart was generated using voice recognition Dragon dictation software. Although every effort was made to ensure the accuracy of this automated transcription, some errors in transcription may have occurred.

## 2022-11-28 RX ORDER — CYCLOBENZAPRINE HCL 10 MG
10 TABLET ORAL 3 TIMES DAILY PRN
Qty: 21 TABLET | Refills: 0 | Status: SHIPPED | OUTPATIENT
Start: 2022-11-28 | End: 2022-12-08

## 2022-12-07 ENCOUNTER — HOSPITAL ENCOUNTER (OUTPATIENT)
Age: 62
Discharge: HOME | End: 2022-12-09
Payer: COMMERCIAL

## 2022-12-07 ENCOUNTER — HOSPITAL ENCOUNTER (OUTPATIENT)
Dept: GENERAL RADIOLOGY | Age: 62
Discharge: HOME | End: 2022-12-09
Payer: COMMERCIAL

## 2022-12-07 DIAGNOSIS — M48.02 CERVICAL SPINAL STENOSIS: ICD-10-CM

## 2022-12-07 PROCEDURE — 72040 X-RAY EXAM NECK SPINE 2-3 VW: CPT

## 2022-12-14 ENCOUNTER — OFFICE VISIT (OUTPATIENT)
Dept: NEUROSURGERY | Age: 62
End: 2022-12-14

## 2022-12-14 VITALS
HEART RATE: 93 BPM | OXYGEN SATURATION: 97 % | TEMPERATURE: 97.3 F | SYSTOLIC BLOOD PRESSURE: 131 MMHG | HEIGHT: 64 IN | WEIGHT: 138 LBS | DIASTOLIC BLOOD PRESSURE: 84 MMHG | BODY MASS INDEX: 23.56 KG/M2

## 2022-12-14 DIAGNOSIS — G99.2 STENOSIS OF CERVICAL SPINE WITH MYELOPATHY (HCC): Primary | ICD-10-CM

## 2022-12-14 DIAGNOSIS — M48.02 STENOSIS OF CERVICAL SPINE WITH MYELOPATHY (HCC): Primary | ICD-10-CM

## 2022-12-14 PROCEDURE — 99024 POSTOP FOLLOW-UP VISIT: CPT | Performed by: NEUROLOGICAL SURGERY

## 2022-12-14 NOTE — PROGRESS NOTES
[address]  [facility] [address] [facility] [address]  Dept: [phone]    Patient:  Jayesh Valencia  YOB: 1960  Date: 12/14/22    The patient is a 65 y.o. female who presents today for consult of the following problems:     Chief Complaint   Patient presents with    Other             HPI:     Jayesh Valencia is a 65 y.o. female on whom neurosurgical consultation was requested by STEPHY Brower - CNP for management of cervical stenosis with myelopathy. Patient is approximate 7 weeks out from anterior C6 corpectomy. Unfortunate she still has some persistent symptoms including numbness tingling and dexterity issues that have had only modest improvement. Does not have any axial neck pain or issues with dysphagia at all. Has not initiated physical therapy as of yet has been compliant with the collar. Unfortunately the bone stimulator was declined by her insurance. Kristen Hu       History:     Past Medical History:   Diagnosis Date    COPD (chronic obstructive pulmonary disease) ([facility] [address]     Depression     History of abnormal Pap smear     ASCUS (+)HPV    HLD (hyperlipidemia)     Hypertension     Liver disease     Liver mass     Stenosis of cervical spine     Under care of service provider 10/05/2022    pcp-rose veronica-last visit july 2022    Under care of service provider 10/05/2022    reese carlos-last visit june 2022     Past Surgical History:   Procedure Laterality Date    CARPAL TUNNEL RELEASE Left 06/2022    [facility]    CERVICAL FUSION N/A 10/18/2022    C6 CERVICAL CORPECTOMY performed by Carmen Vazquez DO at [facility]    Dr Ely Rose  01/01/2005    HAND SURGERY  01/01/2013    right hand, trigger thumb    VERTEBRAL CORPECTOMY  10/18/2022    C6 CERVICAL CORPECTOMY     Family History   Problem Relation Age of Onset    Breast Cancer Mother     Cirrhosis Mother     Alcohol Abuse Mother     Hypertension Father     Breast Cancer Maternal Grandmother      Current Outpatient Medications on File Prior to Visit   Medication Sig Dispense Refill    potassium chloride (KLOR-CON M) 20 MEQ extended release tablet Take 1 tablet by mouth daily 30 tablet 0    Multiple Vitamins-Minerals (THERAPEUTIC MULTIVITAMIN-MINERALS) tablet Take 1 tablet by mouth daily      Calcium Carbonate (CALCIUM 500 PO) Take 1 tablet by mouth daily      vitamin E 100 units capsule Take 100 Units by mouth daily      Cholecalciferol (VITAMIN D) 50 MCG (2000 UT) CAPS capsule Take 1 capsule by mouth daily      lisinopril (PRINIVIL;ZESTRIL) 10 MG tablet Take 1 tablet by mouth daily 90 tablet 1    hydroCHLOROthiazide (HYDRODIURIL) 25 MG tablet Take 1 tablet by mouth daily 90 tablet 1    atorvastatin (LIPITOR) 20 MG tablet Take 1 tablet by mouth daily 90 tablet 1    venlafaxine (EFFEXOR) 37.5 MG tablet Take 1 tablet by mouth daily 90 tablet 1    tiotropium (SPIRIVA RESPIMAT) 2.5 MCG/ACT AERS inhaler Inhale 2 puffs into the lungs daily (Patient not taking: No sig reported) 4 g 3    tiotropium (SPIRIVA RESPIMAT) 2.5 MCG/ACT AERS inhaler Inhale 2 puffs into the lungs daily (Patient not taking: No sig reported) 4 g 0    albuterol sulfate  (90 Base) MCG/ACT inhaler INHALE 2 PUFFS BY MOUTH EVERY SIX HOURS AS NEEDED       No current facility-administered medications on file prior to visit. Social History     Tobacco Use    Smoking status: Former     Packs/day: 1.00     Years: 40.00     Pack years: 40.00     Types: Cigarettes     Start date: 1976     Quit date: 2017     Years since quittin.3    Smokeless tobacco: Never   Vaping Use    Vaping Use: Never used   Substance Use Topics    Alcohol use:  Yes     Alcohol/week: 12.0 standard drinks     Types: 2 Glasses of wine, 10 Shots of liquor per week     Comment: Vodka 3 drinks per day    Drug use: No       No Known Allergies    Review of Systems  ROS: numbness tingling    Physical Exam:      /84 (Site: Right Upper Arm, Position: Sitting, Cuff Size: Large Adult)   Pulse 93   Temp 97.3 °F (36.3 °C) (Temporal)   Ht 5' 4\" (1.626 m)   Wt 138 lb (62.6 kg)   SpO2 97%   BMI 23.69 kg/m²   Estimated body mass index is 23.69 kg/m² as calculated from the following:    Height as of this encounter: 5' 4\" (1.626 m). Weight as of this encounter: 138 lb (62.6 kg). General:  Chata Gifford is a 65y.o. year old female who appears her stated age. HEENT: Normocephalic atraumatic. Neck supple. Chest: regular rate; pulses equal. Equal chest rise and fall  Abdomen: Soft nondistended. Ext: DP equal with good capillary refill  Neuro    Mentation  Appropriate affect   oriented    Cranial Nerves:   Pupils equal and reactive to light  Extraocular motion intact  Face symmetric  No dysarthria  v1-3 sensation symmetric, masseter tone symmetric  Hearing symmetric and intact to finger rub    Sensation:   Diminished UE distal    Motor  L deltoid 5/5; R deltoid 5/5  L biceps 5/5; R biceps 5/5  L triceps 5/5; R triceps 5/5  L wrist extension 5/5; R wrist extension 5/5  L intrinsics 5/5; R intrinsics 5/5     L iliopsoas 5/5 , R iliopsoas 5/5  L quadriceps 5/5; R quadriceps 5/5  L Dorsiflexion 5/5; R dorsiflexion 5/5  L Plantarflexion 5/5; R plantarflexion 5/5  L EHL 5/5; R EHL 5/5    Reflexes  L Brachioradialis 2+/4; R brachioradialis 2+/4  L Biceps 2+/4; R Biceps 2+/4  L Triceps 2+/4; R Triceps 2+/4  L Patellar 2+/4: R Patellar 2+/4  L Achilles 2+/4; R Achilles 2+/4    hoffmans L: neg  hoffmans R: neg  Clonus L: neg  Clonus R: neg  Babinski L: up  Babinski R; up    Studies Review:     Xrays stable with minimal listhesis  cephalad    Assessment and Plan:      1. Stenosis of cervical spine with myelopathy ([address]          Plan: Counseled on duration of myelopathy recovery which can take upwards of 6 to 12 months.   We will initiate occupational and physical therapy oriented towards upper extremity dexterity strengthening range of motion. Wean collar over the next 2 weeks. Followup: No follow-ups on file. Prescriptions Ordered:  No orders of the defined types were placed in this encounter. Orders Placed:  Orders Placed This Encounter   Procedures    Darell Barraza     Referral Priority:   Routine     Referral Type:   Eval and Treat     Referral Reason:   Specialty Services Required     Requested Specialty:   Physical Therapist     Number of Visits Requested:   [facility]     Referral Priority:   Routine     Referral Type:   Eval and Treat     Referral Reason:   Specialty Services Required     Requested Specialty:   Occupational Therapy     Number of Visits Requested:   1        Electronically signed by Chuy Mancilla DO on 12/14/2022 at 10:53 AM    Please note that this chart was generated using voice recognition Dragon dictation software. Although every effort was made to ensure the accuracy of this automated transcription, some errors in transcription may have occurred.

## 2022-12-26 DIAGNOSIS — I10 PRIMARY HYPERTENSION: ICD-10-CM

## 2022-12-26 DIAGNOSIS — E78.49 OTHER HYPERLIPIDEMIA: ICD-10-CM

## 2022-12-27 RX ORDER — LISINOPRIL 10 MG/1
TABLET ORAL
Qty: 90 TABLET | Refills: 3 | Status: SHIPPED | OUTPATIENT
Start: 2022-12-27

## 2022-12-27 RX ORDER — ATORVASTATIN CALCIUM 20 MG/1
TABLET, FILM COATED ORAL
Qty: 90 TABLET | Refills: 3 | Status: SHIPPED | OUTPATIENT
Start: 2022-12-27

## 2022-12-27 NOTE — TELEPHONE ENCOUNTER
Gilbert Quintana is calling to request a refill on the following medication(s):    Last Visit Date (If Applicable):  4/90/8080    Next Visit Date:    Visit date not found    Medication Request:  Requested Prescriptions     Pending Prescriptions Disp Refills    atorvastatin (LIPITOR) 20 MG tablet [Pharmacy Med Name: ATORVASTATIN TABS 20MG] 90 tablet 3     Sig: TAKE 1 TABLET DAILY    lisinopril (PRINIVIL;ZESTRIL) 10 MG tablet [Pharmacy Med Name: LISINOPRIL TABS 10MG] 90 tablet 3     Sig: TAKE 1 TABLET DAILY

## 2023-01-02 ENCOUNTER — HOSPITAL ENCOUNTER (OUTPATIENT)
Dept: CT IMAGING | Age: 63
Discharge: HOME | End: 2023-01-04
Payer: COMMERCIAL

## 2023-01-02 DIAGNOSIS — R91.1 SOLID NODULE OF LUNG GREATER THAN 8 MM IN DIAMETER: ICD-10-CM

## 2023-01-02 PROCEDURE — 71250 CT THORAX DX C-: CPT

## 2023-01-04 DIAGNOSIS — I10 PRIMARY HYPERTENSION: ICD-10-CM

## 2023-01-04 RX ORDER — HYDROCHLOROTHIAZIDE 25 MG/1
TABLET ORAL
Qty: 90 TABLET | Refills: 3 | Status: SHIPPED | OUTPATIENT
Start: 2023-01-04

## 2023-01-04 NOTE — TELEPHONE ENCOUNTER
Jewel Jean-Baptiste is calling to request a refill on the following medication(s):    Last Visit Date (If Applicable):  9/09/6739    Next Visit Date:    Visit date not found    Medication Request:  Requested Prescriptions     Pending Prescriptions Disp Refills    hydroCHLOROthiazide (HYDRODIURIL) 25 MG tablet [Pharmacy Med Name: HYDROCHLOROTHIAZIDE TABS 25MG] 90 tablet 3     Sig: TAKE 1 TABLET DAILY

## 2023-01-23 ENCOUNTER — HOSPITAL ENCOUNTER (OUTPATIENT)
Dept: MAMMOGRAPHY | Age: 63
Discharge: HOME | End: 2023-01-25
Payer: COMMERCIAL

## 2023-01-23 DIAGNOSIS — Z12.31 ENCOUNTER FOR SCREENING MAMMOGRAM FOR MALIGNANT NEOPLASM OF BREAST: ICD-10-CM

## 2023-01-23 PROCEDURE — 77063 BREAST TOMOSYNTHESIS BI: CPT

## 2023-03-22 ENCOUNTER — HOSPITAL ENCOUNTER (OUTPATIENT)
Facility: CLINIC | Age: 63
Discharge: HOME | End: 2023-03-24
Payer: COMMERCIAL

## 2023-03-22 ENCOUNTER — HOSPITAL ENCOUNTER (OUTPATIENT)
Dept: GENERAL RADIOLOGY | Facility: CLINIC | Age: 63
Discharge: HOME | End: 2023-03-24
Payer: COMMERCIAL

## 2023-03-22 DIAGNOSIS — G99.2 STENOSIS OF CERVICAL SPINE WITH MYELOPATHY (HCC): ICD-10-CM

## 2023-03-22 DIAGNOSIS — M48.02 STENOSIS OF CERVICAL SPINE WITH MYELOPATHY (HCC): ICD-10-CM

## 2023-03-22 PROCEDURE — 72040 X-RAY EXAM NECK SPINE 2-3 VW: CPT

## 2023-03-27 ENCOUNTER — OFFICE VISIT (OUTPATIENT)
Dept: NEUROSURGERY | Age: 63
End: 2023-03-27
Payer: COMMERCIAL

## 2023-03-27 VITALS
WEIGHT: 138 LBS | DIASTOLIC BLOOD PRESSURE: 86 MMHG | HEART RATE: 109 BPM | HEIGHT: 64 IN | OXYGEN SATURATION: 93 % | BODY MASS INDEX: 23.56 KG/M2 | SYSTOLIC BLOOD PRESSURE: 140 MMHG

## 2023-03-27 DIAGNOSIS — M48.02 STENOSIS OF CERVICAL SPINE WITH MYELOPATHY (HCC): Primary | ICD-10-CM

## 2023-03-27 DIAGNOSIS — G99.2 STENOSIS OF CERVICAL SPINE WITH MYELOPATHY (HCC): Primary | ICD-10-CM

## 2023-03-27 PROCEDURE — 99213 OFFICE O/P EST LOW 20 MIN: CPT | Performed by: NEUROLOGICAL SURGERY

## 2023-03-27 PROCEDURE — 3079F DIAST BP 80-89 MM HG: CPT | Performed by: NEUROLOGICAL SURGERY

## 2023-03-27 PROCEDURE — 3077F SYST BP >= 140 MM HG: CPT | Performed by: NEUROLOGICAL SURGERY

## 2023-03-27 NOTE — PROGRESS NOTES
MRI cervical spine to ensure that there is no ongoing stenosis or adjacent level disease. We will hold off on EMG considering that she did not improve after her prior carpal tunnel surgery and it appears that most of her symptoms are central.  No provocative findings on imaging related to peripheral neuropathy as well. Counseled overall on recovery of sensory symptoms which can take upwards of 1 year. Followup: No follow-ups on file. Prescriptions Ordered:  No orders of the defined types were placed in this encounter. Orders Placed:  Orders Placed This Encounter   Procedures    MRI CERVICAL SPINE WO CONTRAST     Standing Status:   Future     Standing Expiration Date:   3/27/2024     Order Specific Question:   Reason for exam:     Answer:   aniya persisent stenosis     Order Specific Question:   What is the sedation requirement? Answer:   None        Electronically signed by Merline Collie, DO on 3/27/2023 at 1:53 PM    Please note that this chart was generated using voice recognition Dragon dictation software. Although every effort was made to ensure the accuracy of this automated transcription, some errors in transcription may have occurred.

## 2023-04-19 ENCOUNTER — HOSPITAL ENCOUNTER (OUTPATIENT)
Dept: MRI IMAGING | Facility: CLINIC | Age: 63
Discharge: HOME | End: 2023-04-21
Payer: COMMERCIAL

## 2023-04-19 DIAGNOSIS — M48.02 STENOSIS OF CERVICAL SPINE WITH MYELOPATHY (HCC): ICD-10-CM

## 2023-04-19 DIAGNOSIS — G99.2 STENOSIS OF CERVICAL SPINE WITH MYELOPATHY (HCC): ICD-10-CM

## 2023-04-19 PROCEDURE — 72141 MRI NECK SPINE W/O DYE: CPT

## 2023-05-24 ENCOUNTER — OFFICE VISIT (OUTPATIENT)
Dept: NEUROSURGERY | Age: 63
End: 2023-05-24
Payer: COMMERCIAL

## 2023-05-24 VITALS
TEMPERATURE: 98.8 F | OXYGEN SATURATION: 98 % | HEART RATE: 91 BPM | WEIGHT: 137 LBS | SYSTOLIC BLOOD PRESSURE: 124 MMHG | BODY MASS INDEX: 23.52 KG/M2 | DIASTOLIC BLOOD PRESSURE: 83 MMHG | RESPIRATION RATE: 20 BRPM

## 2023-05-24 DIAGNOSIS — G99.2 STENOSIS OF CERVICAL SPINE WITH MYELOPATHY (HCC): Primary | ICD-10-CM

## 2023-05-24 DIAGNOSIS — M48.02 STENOSIS OF CERVICAL SPINE WITH MYELOPATHY (HCC): Primary | ICD-10-CM

## 2023-05-24 PROCEDURE — 3079F DIAST BP 80-89 MM HG: CPT | Performed by: NEUROLOGICAL SURGERY

## 2023-05-24 PROCEDURE — 3074F SYST BP LT 130 MM HG: CPT | Performed by: NEUROLOGICAL SURGERY

## 2023-05-24 PROCEDURE — 99213 OFFICE O/P EST LOW 20 MIN: CPT | Performed by: NEUROLOGICAL SURGERY

## 2023-05-24 RX ORDER — GABAPENTIN 300 MG/1
300 CAPSULE ORAL 2 TIMES DAILY
Qty: 60 CAPSULE | Refills: 0 | Status: SHIPPED | OUTPATIENT
Start: 2023-05-24 | End: 2023-06-23

## 2023-05-24 RX ORDER — BACLOFEN 10 MG/1
10 TABLET ORAL 2 TIMES DAILY
Qty: 60 TABLET | Refills: 0 | Status: SHIPPED | OUTPATIENT
Start: 2023-05-24 | End: 2023-06-23

## 2023-05-24 NOTE — PROGRESS NOTES
[address]  [facility] [address] [facility] [address]  Dept: [phone]    Patient:  Ti Mace  YOB: 1960  Date: 5/24/23    The patient is a 65 y.o. female who presents today for consult of the following problems:     Chief Complaint   Patient presents with    Follow-up             HPI:     Ti Mace is a 65 y.o. female on whom neurosurgical consultation was requested by STEPHY Wilcox CNP for management of cervical stenosis with myelopathy. The patient overall has had significant proved in terms of her dexterity of her extremity functioning as well as strength and ataxia. However she is noting that she has had what appears to be some worsening numbness above her elbow as of recent. Overall her sensation is improved from prior to surgery to now. Also she does have a significant mount of difficulty with driving and states that she gets cramping of her hands where she has to stop and get them to loosen up from time to time. No ataxia no use of cane or assistive device to ambulate. Ana Laura Lira       History:     Past Medical History:   Diagnosis Date    COPD (chronic obstructive pulmonary disease) ([address]     Depression     History of abnormal Pap smear     ASCUS (+)HPV    HLD (hyperlipidemia)     Hypertension     Liver disease     Liver mass     Stenosis of cervical spine     Under care of service provider 10/05/2022    pcp-rose veronica-last visit july 2022    Under care of service provider 10/05/2022    reese carlos-last visit june 2022     Past Surgical History:   Procedure Laterality Date    CARPAL TUNNEL RELEASE Left 06/2022    [facility]    CERVICAL FUSION N/A 10/18/2022    C6 CERVICAL CORPECTOMY performed by Mariaelena Raymundo DO at [address] [facility]    Dr Italo Perez  01/01/2005    HAND SURGERY  01/01/2013    right hand, trigger

## 2023-08-06 NOTE — PATIENT INSTRUCTIONS
Fasting labs  Mammogram and CT lung screening - [phone] to schedule at [facility]        What is lung cancer screening? Lung cancer screening is a way in which doctors check the lungs for early signs of cancer in people who have no symptoms of lung cancer. A low-dose CT scan uses much less radiation than a normal CT scan and shows a more detailed image of the lungs than a standard X-ray. The goal of lung cancer screening is to find cancer early, before it has a chance to grow, spread, or cause problems. One large study found that smokers who were screened with low-dose CT scans were less likely to die of lung cancer than those who were screened with standard X-ray. Below is a summary of the things you need to know regarding screening for lung cancer with low-dose computed tomography (LDCT). This is a screening program that involves routine annual screening with LDCT studies of the lung. The LDCTs are done using low-dose radiation that is not thought to increase your cancer risk. If you have other serious medical conditions (other cancers, congestive heart failure) that limit your life expectancy to less than 10 years, you should not undergo lung cancer screening with LDCT. The chance is 20%-60% that the LDCT result will show abnormalities. This would require additional testing which could include repeat imaging or even invasive procedures. Most (about 95%) of \"abnormal\" LDCT results are false in the sense that no lung cancer is ultimately found. Additionally, some (about 10%) of the cancers found would not affect your life expectancy, even if undetected and untreated. If you are still smoking, the single most important thing that you can do to reduce your risk of dying of lung cancer is to quit. For this screening to be covered by Medicare and most other insurers, strict criteria must be met.   If you do not meet these criteria, but still wish to undergo LDCT testing, you will be required to sign a waiver indicating your willingness to pay for the scan. No

## 2024-01-01 DIAGNOSIS — I10 PRIMARY HYPERTENSION: ICD-10-CM

## 2024-01-02 RX ORDER — HYDROCHLOROTHIAZIDE 25 MG/1
TABLET ORAL
Qty: 90 TABLET | Refills: 0 | Status: SHIPPED | OUTPATIENT
Start: 2024-01-02

## 2024-01-02 NOTE — TELEPHONE ENCOUNTER
Millie Ortiz is calling to request a refill on the following medication(s):    Last Visit Date (If Applicable):  6/27/2022    Next Visit Date:    1/19/2024    Medication Request:  Requested Prescriptions     Pending Prescriptions Disp Refills    hydroCHLOROthiazide (HYDRODIURIL) 25 MG tablet [Pharmacy Med Name: HYDROCHLOROTHIAZIDE TABS 25MG] 90 tablet 3     Sig: TAKE 1 TABLET DAILY

## 2024-01-16 DIAGNOSIS — E78.49 OTHER HYPERLIPIDEMIA: Primary | ICD-10-CM

## 2024-01-16 DIAGNOSIS — K76.0 FATTY LIVER: ICD-10-CM

## 2024-01-16 DIAGNOSIS — I10 PRIMARY HYPERTENSION: ICD-10-CM

## 2024-01-16 DIAGNOSIS — F32.A DEPRESSION, UNSPECIFIED DEPRESSION TYPE: ICD-10-CM

## 2024-01-16 DIAGNOSIS — R73.03 PREDIABETES: ICD-10-CM

## 2024-01-16 RX ORDER — VENLAFAXINE 37.5 MG/1
37.5 TABLET ORAL DAILY
Qty: 90 TABLET | Refills: 0 | Status: SHIPPED | OUTPATIENT
Start: 2024-01-16

## 2024-01-16 RX ORDER — HYDROCHLOROTHIAZIDE 25 MG/1
25 TABLET ORAL DAILY
Qty: 90 TABLET | Refills: 0 | Status: SHIPPED | OUTPATIENT
Start: 2024-01-16

## 2024-01-16 NOTE — TELEPHONE ENCOUNTER
Patient stated that she is going to [place] and is needing her antidepressant pills, I believe I pended the right one, she did have them in front of her so she didn't know the name or mgs. Please advise.

## 2024-01-16 NOTE — TELEPHONE ENCOUNTER
Millie Ortiz is calling to request a refill on the following medication(s):    Last Visit Date (If Applicable):  6/27/2022    Next Visit Date:    4/15/2024    Medication Request:  Requested Prescriptions     Pending Prescriptions Disp Refills    hydroCHLOROthiazide (HYDRODIURIL) 25 MG tablet 90 tablet 0     Sig: Take 1 tablet by mouth daily

## 2024-01-17 NOTE — TELEPHONE ENCOUNTER
Spoke to Millie and stressed the importance of getting the lab orders done before leaving for [place]. Patient states, she'll have them done in [place], because she doesn't have transportation to get them done.     Sent her the orders through her [employer] and faxed to [facility] at patients request.

## 2024-03-18 DIAGNOSIS — I10 PRIMARY HYPERTENSION: ICD-10-CM

## 2024-03-18 DIAGNOSIS — E78.49 OTHER HYPERLIPIDEMIA: ICD-10-CM

## 2024-03-18 RX ORDER — ATORVASTATIN CALCIUM 20 MG/1
TABLET, FILM COATED ORAL
Qty: 90 TABLET | Refills: 0 | Status: SHIPPED | OUTPATIENT
Start: 2024-03-18

## 2024-03-18 RX ORDER — LISINOPRIL 10 MG/1
TABLET ORAL
Qty: 90 TABLET | Refills: 0 | Status: SHIPPED | OUTPATIENT
Start: 2024-03-18

## 2024-03-18 NOTE — TELEPHONE ENCOUNTER
Current Outpatient Medications on File Prior to Visit   Medication Sig Dispense Refill    hydroCHLOROthiazide (HYDRODIURIL) 25 MG tablet Take 1 tablet by mouth daily 90 tablet 0    venlafaxine (EFFEXOR) 37.5 MG tablet Take 1 tablet by mouth daily 90 tablet 0    lisinopril (PRINIVIL;ZESTRIL) 10 MG tablet TAKE 1 TABLET DAILY 90 tablet 0    atorvastatin (LIPITOR) 20 MG tablet TAKE 1 TABLET DAILY 90 tablet 0    gabapentin (NEURONTIN) 300 MG capsule Take 1 capsule by mouth 2 times daily for 30 days. Max Daily Amount: 600 mg 60 capsule 0    potassium chloride (KLOR-CON M) 20 MEQ extended release tablet Take 1 tablet by mouth daily (Patient not taking: Reported on 3/27/2023) 30 tablet 0    Multiple Vitamins-Minerals (THERAPEUTIC MULTIVITAMIN-MINERALS) tablet Take 1 tablet by mouth daily      Calcium Carbonate (CALCIUM 500 PO) Take 1 tablet by mouth daily      vitamin E 100 units capsule Take 1 capsule by mouth daily      Cholecalciferol (VITAMIN D) 50 MCG (2000 UT) CAPS capsule Take 1 capsule by mouth daily       No current facility-administered medications on file prior to visit.

## 2024-03-29 DIAGNOSIS — I10 PRIMARY HYPERTENSION: ICD-10-CM

## 2024-03-29 RX ORDER — HYDROCHLOROTHIAZIDE 25 MG/1
25 TABLET ORAL DAILY
Qty: 90 TABLET | Refills: 0 | Status: SHIPPED | OUTPATIENT
Start: 2024-03-29

## 2024-03-29 NOTE — TELEPHONE ENCOUNTER
Millie Ortiz is calling to request a refill on the following medication(s):    Last Visit Date (If Applicable):  6/27/2022    Next Visit Date:    4/15/2024    Medication Request:  Requested Prescriptions     Pending Prescriptions Disp Refills    hydroCHLOROthiazide (HYDRODIURIL) 25 MG tablet [Pharmacy Med Name: HYDROCHLOROTHIAZIDE TABS 25MG] 90 tablet 3     Sig: TAKE 1 TABLET DAILY

## 2024-04-11 ENCOUNTER — HOSPITAL ENCOUNTER (OUTPATIENT)
Age: 64
Setting detail: SPECIMEN
Discharge: HOME | End: 2024-04-11

## 2024-04-11 DIAGNOSIS — I10 PRIMARY HYPERTENSION: ICD-10-CM

## 2024-04-11 DIAGNOSIS — E78.49 OTHER HYPERLIPIDEMIA: ICD-10-CM

## 2024-04-11 DIAGNOSIS — R73.03 PREDIABETES: ICD-10-CM

## 2024-04-11 DIAGNOSIS — K76.0 FATTY LIVER: ICD-10-CM

## 2024-04-12 LAB
ALBUMIN SERPL-MCNC: 4.4 G/DL (ref 3.5–5.2)
ALBUMIN/GLOB SERPL: 2 {RATIO} (ref 1–2.5)
ALP SERPL-CCNC: 89 U/L (ref 35–104)
ALT SERPL-CCNC: 32 U/L (ref 10–35)
ANION GAP SERPL CALCULATED.3IONS-SCNC: 12 MMOL/L (ref 9–16)
AST SERPL-CCNC: 52 U/L (ref 10–35)
BILIRUB SERPL-MCNC: 0.5 MG/DL (ref 0–1.2)
BUN SERPL-MCNC: 5 MG/DL (ref 8–23)
CALCIUM SERPL-MCNC: 10.2 MG/DL (ref 8.6–10.4)
CHLORIDE SERPL-SCNC: 100 MMOL/L (ref 98–107)
CHOLEST SERPL-MCNC: 247 MG/DL (ref 0–199)
CHOLESTEROL/HDL RATIO: 2
CO2 SERPL-SCNC: 26 MMOL/L (ref 20–31)
CREAT SERPL-MCNC: 0.7 MG/DL (ref 0.5–0.9)
ERYTHROCYTE [DISTWIDTH] IN BLOOD BY AUTOMATED COUNT: 13.6 % (ref 11.8–14.4)
EST. AVERAGE GLUCOSE BLD GHB EST-MCNC: 114 MG/DL
GFR SERPL CREATININE-BSD FRML MDRD: >90 ML/MIN/1.73M2
GLUCOSE P FAST SERPL-MCNC: 109 MG/DL (ref 74–99)
HBA1C MFR BLD: 5.6 % (ref 4–6)
HCT VFR BLD AUTO: 41.4 % (ref 36.3–47.1)
HCV AB SERPL QL IA: NONREACTIVE
HDLC SERPL-MCNC: 118 MG/DL
HGB BLD-MCNC: 14.3 G/DL (ref 11.9–15.1)
LDLC SERPL CALC-MCNC: 102 MG/DL (ref 0–100)
MCH RBC QN AUTO: 35.3 PG (ref 25.2–33.5)
MCHC RBC AUTO-ENTMCNC: 34.5 G/DL (ref 28.4–34.8)
MCV RBC AUTO: 102.2 FL (ref 82.6–102.9)
NRBC BLD-RTO: 0 PER 100 WBC
PLATELET # BLD AUTO: 325 K/UL (ref 138–453)
PMV BLD AUTO: 9.5 FL (ref 8.1–13.5)
POTASSIUM SERPL-SCNC: 4.2 MMOL/L (ref 3.7–5.3)
PROT SERPL-MCNC: 6.9 G/DL (ref 6.6–8.7)
RBC # BLD AUTO: 4.05 M/UL (ref 3.95–5.11)
SODIUM SERPL-SCNC: 138 MMOL/L (ref 136–145)
TRIGL SERPL-MCNC: 137 MG/DL
TSH SERPL DL<=0.05 MIU/L-ACNC: 1.49 UIU/ML (ref 0.27–4.2)
VLDLC SERPL CALC-MCNC: 27 MG/DL
WBC OTHER # BLD: 3.6 K/UL (ref 3.5–11.3)

## 2024-04-13 ASSESSMENT — PATIENT HEALTH QUESTIONNAIRE - PHQ9
4. FEELING TIRED OR HAVING LITTLE ENERGY: SEVERAL DAYS
SUM OF ALL RESPONSES TO PHQ QUESTIONS 1-9: 5
10. IF YOU CHECKED OFF ANY PROBLEMS, HOW DIFFICULT HAVE THESE PROBLEMS MADE IT FOR YOU TO DO YOUR WORK, TAKE CARE OF THINGS AT HOME, OR GET ALONG WITH OTHER PEOPLE: NOT DIFFICULT AT ALL
SUM OF ALL RESPONSES TO PHQ QUESTIONS 1-9: 5
5. POOR APPETITE OR OVEREATING: SEVERAL DAYS
1. LITTLE INTEREST OR PLEASURE IN DOING THINGS: SEVERAL DAYS
7. TROUBLE CONCENTRATING ON THINGS, SUCH AS READING THE NEWSPAPER OR WATCHING TELEVISION: NOT AT ALL
6. FEELING BAD ABOUT YOURSELF - OR THAT YOU ARE A FAILURE OR HAVE LET YOURSELF OR YOUR FAMILY DOWN: SEVERAL DAYS
9. THOUGHTS THAT YOU WOULD BE BETTER OFF DEAD, OR OF HURTING YOURSELF: NOT AT ALL
SUM OF ALL RESPONSES TO PHQ QUESTIONS 1-9: 5
4. FEELING TIRED OR HAVING LITTLE ENERGY: SEVERAL DAYS
8. MOVING OR SPEAKING SO SLOWLY THAT OTHER PEOPLE COULD HAVE NOTICED. OR THE OPPOSITE, BEING SO FIGETY OR RESTLESS THAT YOU HAVE BEEN MOVING AROUND A LOT MORE THAN USUAL: NOT AT ALL
SUM OF ALL RESPONSES TO PHQ QUESTIONS 1-9: 5
SUM OF ALL RESPONSES TO PHQ QUESTIONS 1-9: 5
5. POOR APPETITE OR OVEREATING: SEVERAL DAYS
3. TROUBLE FALLING OR STAYING ASLEEP: NOT AT ALL
9. THOUGHTS THAT YOU WOULD BE BETTER OFF DEAD, OR OF HURTING YOURSELF: NOT AT ALL
6. FEELING BAD ABOUT YOURSELF - OR THAT YOU ARE A FAILURE OR HAVE LET YOURSELF OR YOUR FAMILY DOWN: SEVERAL DAYS
10. IF YOU CHECKED OFF ANY PROBLEMS, HOW DIFFICULT HAVE THESE PROBLEMS MADE IT FOR YOU TO DO YOUR WORK, TAKE CARE OF THINGS AT HOME, OR GET ALONG WITH OTHER PEOPLE: NOT DIFFICULT AT ALL
2. FEELING DOWN, DEPRESSED OR HOPELESS: SEVERAL DAYS
3. TROUBLE FALLING OR STAYING ASLEEP: NOT AT ALL
2. FEELING DOWN, DEPRESSED OR HOPELESS: SEVERAL DAYS
7. TROUBLE CONCENTRATING ON THINGS, SUCH AS READING THE NEWSPAPER OR WATCHING TELEVISION: NOT AT ALL
8. MOVING OR SPEAKING SO SLOWLY THAT OTHER PEOPLE COULD HAVE NOTICED. OR THE OPPOSITE - BEING SO FIDGETY OR RESTLESS THAT YOU HAVE BEEN MOVING AROUND A LOT MORE THAN USUAL: NOT AT ALL
1. LITTLE INTEREST OR PLEASURE IN DOING THINGS: SEVERAL DAYS
SUM OF ALL RESPONSES TO PHQ9 QUESTIONS 1 & 2: 2

## 2024-04-14 NOTE — PROGRESS NOTES
(PRINIVIL;ZESTRIL) 10 MG tablet; Take 1 tablet by mouth daily, Disp-90 tablet, R-2Normal  3. Chronic obstructive pulmonary disease, unspecified COPD type (HCC)  Comments:  Low-dose CT lung screening ordered.  Quit smoking 2019.  4. Personal history of tobacco use  -     AR VISIT TO DISCUSS LUNG CA SCREEN W LDCT  -     CT Lung Screen (Initial/Annual/Baseline); Future  5. Mixed hyperlipidemia  -     atorvastatin (LIPITOR) 20 MG tablet; Take 1 tablet by mouth daily, Disp-90 tablet, R-2Normal  6. Encounter for screening mammogram for malignant neoplasm of breast  -     [facility] DIGITAL SCREEN BILATERAL; Future  7. NAFLD (nonalcoholic fatty liver disease)  -     [facility] - Karthikeyan Ayala MD, Gastroenterology, [place]  8. Depression, unspecified depression type  -     venlafaxine (EFFEXOR) 37.5 MG tablet; Take 1 tablet by mouth daily, Disp-90 tablet, R-2Normal     Medication refill sent to pharmacy.  Encouraged routine dosing of all medications.  Agreeable to schedule well woman with Pap and pneumococcal vaccination at that time.  New referral given for GI.  Understanding and agreement was voiced with all above plans. All questions answered to satisfaction.   Encouraged efforts at healthy diet and regular physical activity.   Call office with any questions or new or worsening symptoms or concerns.     Return if symptoms worsen or fail to improve, for pap smear.            Electronically signed by STEPHY LEE CNP on 4/15/2024 at 3:07 PM    Note is dictated utilizing voice recognition software. Unfortunately this leads to occasional typographical errors. Please contact our office if you have any questions.Discussed with the patient the current USPSTF guidelines released March 9, 2021 for screening for lung cancer.    For adults aged 50 to 80 years who have a 20 pack-year smoking history and currently smoke or have quit within the past 15 years the grade B recommendation is to:  Screen for lung cancer with low-dose

## 2024-04-15 ENCOUNTER — OFFICE VISIT (OUTPATIENT)
Dept: FAMILY MEDICINE CLINIC | Age: 64
End: 2024-04-15
Payer: COMMERCIAL

## 2024-04-15 VITALS
SYSTOLIC BLOOD PRESSURE: 129 MMHG | DIASTOLIC BLOOD PRESSURE: 91 MMHG | HEIGHT: 64 IN | WEIGHT: 130 LBS | BODY MASS INDEX: 22.2 KG/M2 | HEART RATE: 107 BPM

## 2024-04-15 DIAGNOSIS — K76.0 NAFLD (NONALCOHOLIC FATTY LIVER DISEASE): ICD-10-CM

## 2024-04-15 DIAGNOSIS — J44.9 CHRONIC OBSTRUCTIVE PULMONARY DISEASE, UNSPECIFIED COPD TYPE (HCC): ICD-10-CM

## 2024-04-15 DIAGNOSIS — I10 PRIMARY HYPERTENSION: ICD-10-CM

## 2024-04-15 DIAGNOSIS — Z12.31 ENCOUNTER FOR SCREENING MAMMOGRAM FOR MALIGNANT NEOPLASM OF BREAST: ICD-10-CM

## 2024-04-15 DIAGNOSIS — Z87.891 PERSONAL HISTORY OF TOBACCO USE: ICD-10-CM

## 2024-04-15 DIAGNOSIS — Z00.00 WELL ADULT EXAM: Primary | ICD-10-CM

## 2024-04-15 DIAGNOSIS — F32.A DEPRESSION, UNSPECIFIED DEPRESSION TYPE: ICD-10-CM

## 2024-04-15 DIAGNOSIS — E78.2 MIXED HYPERLIPIDEMIA: ICD-10-CM

## 2024-04-15 PROCEDURE — 99396 PREV VISIT EST AGE 40-64: CPT | Performed by: NURSE PRACTITIONER

## 2024-04-15 PROCEDURE — G0296 VISIT TO DETERM LDCT ELIG: HCPCS | Performed by: NURSE PRACTITIONER

## 2024-04-15 PROCEDURE — 3080F DIAST BP >= 90 MM HG: CPT | Performed by: NURSE PRACTITIONER

## 2024-04-15 PROCEDURE — 3074F SYST BP LT 130 MM HG: CPT | Performed by: NURSE PRACTITIONER

## 2024-04-15 RX ORDER — HYDROCHLOROTHIAZIDE 25 MG/1
25 TABLET ORAL DAILY
Qty: 90 TABLET | Refills: 2 | Status: SHIPPED | OUTPATIENT
Start: 2024-04-15

## 2024-04-15 RX ORDER — VENLAFAXINE 37.5 MG/1
37.5 TABLET ORAL DAILY
Qty: 90 TABLET | Refills: 2 | Status: SHIPPED | OUTPATIENT
Start: 2024-04-15

## 2024-04-15 RX ORDER — LISINOPRIL 10 MG/1
10 TABLET ORAL DAILY
Qty: 90 TABLET | Refills: 2 | Status: SHIPPED | OUTPATIENT
Start: 2024-04-15

## 2024-04-15 RX ORDER — ATORVASTATIN CALCIUM 20 MG/1
20 TABLET, FILM COATED ORAL DAILY
Qty: 90 TABLET | Refills: 2 | Status: SHIPPED | OUTPATIENT
Start: 2024-04-15

## 2024-04-15 SDOH — ECONOMIC STABILITY: FOOD INSECURITY: WITHIN THE PAST 12 MONTHS, YOU WORRIED THAT YOUR FOOD WOULD RUN OUT BEFORE YOU GOT MONEY TO BUY MORE.: NEVER TRUE

## 2024-04-15 SDOH — ECONOMIC STABILITY: HOUSING INSECURITY
IN THE LAST 12 MONTHS, WAS THERE A TIME WHEN YOU DID NOT HAVE A STEADY PLACE TO SLEEP OR SLEPT IN A SHELTER (INCLUDING NOW)?: NO

## 2024-04-15 SDOH — ECONOMIC STABILITY: FOOD INSECURITY: WITHIN THE PAST 12 MONTHS, THE FOOD YOU BOUGHT JUST DIDN'T LAST AND YOU DIDN'T HAVE MONEY TO GET MORE.: NEVER TRUE

## 2024-04-15 SDOH — ECONOMIC STABILITY: INCOME INSECURITY: HOW HARD IS IT FOR YOU TO PAY FOR THE VERY BASICS LIKE FOOD, HOUSING, MEDICAL CARE, AND HEATING?: NOT HARD AT ALL

## 2024-04-18 ENCOUNTER — HOSPITAL ENCOUNTER (OUTPATIENT)
Dept: MAMMOGRAPHY | Age: 64
Discharge: HOME | End: 2024-04-20
Payer: COMMERCIAL

## 2024-04-18 DIAGNOSIS — Z12.31 ENCOUNTER FOR SCREENING MAMMOGRAM FOR MALIGNANT NEOPLASM OF BREAST: ICD-10-CM

## 2024-04-18 PROCEDURE — 77063 BREAST TOMOSYNTHESIS BI: CPT

## 2024-05-06 DIAGNOSIS — F32.A DEPRESSION, UNSPECIFIED DEPRESSION TYPE: ICD-10-CM

## 2024-05-06 RX ORDER — VENLAFAXINE 37.5 MG/1
37.5 TABLET ORAL DAILY
Qty: 90 TABLET | Refills: 2 | Status: SHIPPED | OUTPATIENT
Start: 2024-05-06

## 2024-05-15 ENCOUNTER — TELEPHONE (OUTPATIENT)
Dept: ONCOLOGY | Age: 64
End: 2024-05-15

## 2024-05-23 ENCOUNTER — HOSPITAL ENCOUNTER (OUTPATIENT)
Dept: CT IMAGING | Facility: CLINIC | Age: 64
Discharge: HOME | End: 2024-05-25
Payer: COMMERCIAL

## 2024-05-23 DIAGNOSIS — Z87.891 PERSONAL HISTORY OF TOBACCO USE: ICD-10-CM

## 2024-05-23 PROCEDURE — 71271 CT THORAX LUNG CANCER SCR C-: CPT

## 2024-09-04 ENCOUNTER — TELEPHONE (OUTPATIENT)
Dept: FAMILY MEDICINE CLINIC | Age: 64
End: 2024-09-04

## 2024-09-04 DIAGNOSIS — F32.A DEPRESSION, UNSPECIFIED DEPRESSION TYPE: ICD-10-CM

## 2024-09-04 RX ORDER — VENLAFAXINE 75 MG/1
75 TABLET ORAL DAILY
Qty: 30 TABLET | Refills: 0 | Status: SHIPPED | OUTPATIENT
Start: 2024-09-04 | End: 2024-09-06 | Stop reason: SDUPTHER

## 2024-09-04 NOTE — TELEPHONE ENCOUNTER
Patient came into office asking for a written script of the venlafaxine (EFFEXOR) 37.5 MG tablet for a 30 day supply.  Patient stated that she has been doubling up on the medication. Patient is willing to pay out of pocket if insurance does not cover.       Please advise

## 2024-09-06 DIAGNOSIS — F32.A DEPRESSION, UNSPECIFIED DEPRESSION TYPE: ICD-10-CM

## 2024-09-06 RX ORDER — VENLAFAXINE 75 MG/1
75 TABLET ORAL DAILY
Qty: 90 TABLET | Refills: 2 | Status: SHIPPED | OUTPATIENT
Start: 2024-09-06

## 2024-09-24 ENCOUNTER — TELEPHONE (OUTPATIENT)
Dept: FAMILY MEDICINE CLINIC | Age: 64
End: 2024-09-24

## 2024-10-01 ENCOUNTER — OFFICE VISIT (OUTPATIENT)
Dept: FAMILY MEDICINE CLINIC | Age: 64
End: 2024-10-01
Payer: COMMERCIAL

## 2024-10-01 VITALS
WEIGHT: 134 LBS | BODY MASS INDEX: 22.88 KG/M2 | HEART RATE: 99 BPM | DIASTOLIC BLOOD PRESSURE: 72 MMHG | HEIGHT: 64 IN | SYSTOLIC BLOOD PRESSURE: 111 MMHG

## 2024-10-01 DIAGNOSIS — M54.42 CHRONIC BILATERAL LOW BACK PAIN WITH LEFT-SIDED SCIATICA: Primary | ICD-10-CM

## 2024-10-01 DIAGNOSIS — G89.29 CHRONIC BILATERAL LOW BACK PAIN WITH LEFT-SIDED SCIATICA: Primary | ICD-10-CM

## 2024-10-01 PROCEDURE — 3078F DIAST BP <80 MM HG: CPT | Performed by: NURSE PRACTITIONER

## 2024-10-01 PROCEDURE — 99213 OFFICE O/P EST LOW 20 MIN: CPT | Performed by: NURSE PRACTITIONER

## 2024-10-01 PROCEDURE — 3074F SYST BP LT 130 MM HG: CPT | Performed by: NURSE PRACTITIONER

## 2024-10-01 RX ORDER — PREDNISONE 10 MG/1
10 TABLET ORAL 2 TIMES DAILY
Qty: 20 TABLET | Refills: 0 | Status: SHIPPED | OUTPATIENT
Start: 2024-10-01 | End: 2024-10-11

## 2024-10-01 ASSESSMENT — PATIENT HEALTH QUESTIONNAIRE - PHQ9
1. LITTLE INTEREST OR PLEASURE IN DOING THINGS: NOT AT ALL
10. IF YOU CHECKED OFF ANY PROBLEMS, HOW DIFFICULT HAVE THESE PROBLEMS MADE IT FOR YOU TO DO YOUR WORK, TAKE CARE OF THINGS AT HOME, OR GET ALONG WITH OTHER PEOPLE: NOT DIFFICULT AT ALL
SUM OF ALL RESPONSES TO PHQ QUESTIONS 1-9: 4
6. FEELING BAD ABOUT YOURSELF - OR THAT YOU ARE A FAILURE OR HAVE LET YOURSELF OR YOUR FAMILY DOWN: NOT AT ALL
SUM OF ALL RESPONSES TO PHQ QUESTIONS 1-9: 4
5. POOR APPETITE OR OVEREATING: NOT AT ALL
4. FEELING TIRED OR HAVING LITTLE ENERGY: NEARLY EVERY DAY
9. THOUGHTS THAT YOU WOULD BE BETTER OFF DEAD, OR OF HURTING YOURSELF: NOT AT ALL
SUM OF ALL RESPONSES TO PHQ QUESTIONS 1-9: 4
2. FEELING DOWN, DEPRESSED OR HOPELESS: SEVERAL DAYS
SUM OF ALL RESPONSES TO PHQ9 QUESTIONS 1 & 2: 1
8. MOVING OR SPEAKING SO SLOWLY THAT OTHER PEOPLE COULD HAVE NOTICED. OR THE OPPOSITE, BEING SO FIGETY OR RESTLESS THAT YOU HAVE BEEN MOVING AROUND A LOT MORE THAN USUAL: NOT AT ALL
SUM OF ALL RESPONSES TO PHQ QUESTIONS 1-9: 4
7. TROUBLE CONCENTRATING ON THINGS, SUCH AS READING THE NEWSPAPER OR WATCHING TELEVISION: NOT AT ALL
3. TROUBLE FALLING OR STAYING ASLEEP: NOT AT ALL

## 2024-10-01 ASSESSMENT — ENCOUNTER SYMPTOMS
CHEST TIGHTNESS: 0
CONSTIPATION: 0
COLOR CHANGE: 0
BACK PAIN: 1
SHORTNESS OF BREATH: 0
DIARRHEA: 0

## 2024-10-01 NOTE — PROGRESS NOTES
Millie Ortiz is a 64 y.o. female who presents in office today with Self follow up on recent condition of     Chief Complaint   Patient presents with    Back Pain     Worsening sciatic pain for ~3 months.         History of Present Illness:     HPI    She has been having low back pain for the last 3 months.  Pain is mostly left sided and radiates down to right above the left knee.  Denies any trauma or history of trauma. No bowel or bladder incontinence.  She takes ibuprofen daily and it is helpful but pain lingering.     She had a cervical fusion with Dr. Hurt, neurosurgery, 2 years ago. Had PT leading up to cervical surgery.       Care gaps:   Colon CA screenin, repeat in 5 years, Dr Sierra  Vaccines: Pneumococcal, shingles  Hepatitis C/HIV screens:   discussed - low risk  Breast CA screenin2023 negative, family hx breast CA and increased lifetime risk  OB/GYN, cervical CA screening:   Due - last 2018  Low dose CT: 2022   DEXA: 2020 normal  Depression screenin --> 4    Health Maintenance Due   Topic Date Due    HIV screen  Never done    Shingles vaccine (1 of 2) Never done    Cervical cancer screen  2017    Pneumococcal 0-64 years Vaccine (2 of 2 - PCV) 2019    Respiratory Syncytial Virus (RSV) Pregnant or age 60 yrs+ (1 - 1-dose 60+ series) Never done    Flu vaccine (1) Never done    COVID-19 Vaccine (2 - - season) 2024        Patient Care Team:  Ashley King APRN - CNP as PCP - General (Nurse Practitioner)  Ashley King APRN - CNP as PCP - Empaneled Provider    Reviewed     [x] Past Medical, Family, and Social History was reviewed per writer and does contribute to the patient presenting condition.    [x] Laboratory Results, Vital signs, Imaging, Active Problems, Immunizations, Current/Recently Discontinued Medications, Health Maintenance Activities Due, Referral Notes (if available) were reviewed per writer     [x] Reviewed

## 2024-10-08 DIAGNOSIS — G89.29 CHRONIC BILATERAL LOW BACK PAIN WITH LEFT-SIDED SCIATICA: ICD-10-CM

## 2024-10-08 DIAGNOSIS — M54.42 CHRONIC BILATERAL LOW BACK PAIN WITH LEFT-SIDED SCIATICA: ICD-10-CM

## 2025-02-18 DIAGNOSIS — E78.2 MIXED HYPERLIPIDEMIA: ICD-10-CM

## 2025-02-18 DIAGNOSIS — I10 PRIMARY HYPERTENSION: ICD-10-CM

## 2025-02-18 RX ORDER — LISINOPRIL 10 MG/1
10 TABLET ORAL DAILY
Qty: 90 TABLET | Refills: 3 | Status: SHIPPED | OUTPATIENT
Start: 2025-02-18

## 2025-02-18 RX ORDER — ATORVASTATIN CALCIUM 20 MG/1
20 TABLET, FILM COATED ORAL DAILY
Qty: 90 TABLET | Refills: 3 | Status: SHIPPED | OUTPATIENT
Start: 2025-02-18

## 2025-02-18 NOTE — TELEPHONE ENCOUNTER
Millie Ortiz is calling to request a refill on the following medication(s):    Last Visit Date (If Applicable):  10/1/2024    Next Visit Date:    Visit date not found    Medication Request:  Requested Prescriptions     Pending Prescriptions Disp Refills    atorvastatin (LIPITOR) 20 MG tablet [Pharmacy Med Name: ATORVASTATIN TABS 20MG] 90 tablet 3     Sig: TAKE 1 TABLET DAILY    lisinopril (PRINIVIL;ZESTRIL) 10 MG tablet [Pharmacy Med Name: LISINOPRIL TABS 10MG] 90 tablet 3     Sig: TAKE 1 TABLET DAILY

## 2025-03-04 DIAGNOSIS — I10 PRIMARY HYPERTENSION: ICD-10-CM

## 2025-03-04 RX ORDER — HYDROCHLOROTHIAZIDE 25 MG/1
25 TABLET ORAL DAILY
Qty: 90 TABLET | Refills: 3 | Status: SHIPPED | OUTPATIENT
Start: 2025-03-04

## 2025-03-04 NOTE — TELEPHONE ENCOUNTER
Millie Ortiz is calling to request a refill on the following medication(s):    Last Visit Date (If Applicable):  10/1/2024    Next Visit Date:    Visit date not found    Medication Request:  Requested Prescriptions     Pending Prescriptions Disp Refills    hydroCHLOROthiazide (HYDRODIURIL) 25 MG tablet [Pharmacy Med Name: HYDROCHLOROTHIAZIDE TABS 25MG] 90 tablet 3     Sig: TAKE 1 TABLET DAILY

## 2025-05-15 DIAGNOSIS — E78.2 MIXED HYPERLIPIDEMIA: ICD-10-CM

## 2025-05-15 DIAGNOSIS — F32.A DEPRESSION, UNSPECIFIED DEPRESSION TYPE: ICD-10-CM

## 2025-05-15 DIAGNOSIS — I10 PRIMARY HYPERTENSION: ICD-10-CM

## 2025-05-15 RX ORDER — ATORVASTATIN CALCIUM 20 MG/1
20 TABLET, FILM COATED ORAL DAILY
Qty: 90 TABLET | Refills: 1 | Status: SHIPPED | OUTPATIENT
Start: 2025-05-15

## 2025-05-15 RX ORDER — HYDROCHLOROTHIAZIDE 25 MG/1
25 TABLET ORAL DAILY
Qty: 90 TABLET | Refills: 1 | Status: SHIPPED | OUTPATIENT
Start: 2025-05-15

## 2025-05-15 RX ORDER — VENLAFAXINE 75 MG/1
75 TABLET ORAL DAILY
Qty: 90 TABLET | Refills: 1 | Status: SHIPPED | OUTPATIENT
Start: 2025-05-15

## 2025-05-15 RX ORDER — LISINOPRIL 10 MG/1
10 TABLET ORAL DAILY
Qty: 90 TABLET | Refills: 1 | Status: SHIPPED | OUTPATIENT
Start: 2025-05-15

## 2025-05-15 NOTE — TELEPHONE ENCOUNTER
Millie Ortiz is calling to request a refill on the following medication(s):    Last Visit Date (If Applicable):  10/1/2024    Next Visit Date:    Visit date not found    Medication Request:  Requested Prescriptions     Pending Prescriptions Disp Refills    venlafaxine (EFFEXOR) 75 MG tablet 90 tablet 1     Sig: Take 1 tablet by mouth daily    atorvastatin (LIPITOR) 20 MG tablet 90 tablet 1     Sig: Take 1 tablet by mouth daily    lisinopril (PRINIVIL;ZESTRIL) 10 MG tablet 90 tablet 1     Sig: Take 1 tablet by mouth daily    hydroCHLOROthiazide (HYDRODIURIL) 25 MG tablet 90 tablet 1     Sig: Take 1 tablet by mouth daily

## 2025-06-10 ENCOUNTER — HOSPITAL ENCOUNTER (OUTPATIENT)
Dept: MAMMOGRAPHY | Age: 65
Discharge: HOME | End: 2025-06-12
Payer: COMMERCIAL

## 2025-06-10 DIAGNOSIS — Z12.31 VISIT FOR SCREENING MAMMOGRAM: ICD-10-CM

## 2025-06-10 PROCEDURE — 77063 BREAST TOMOSYNTHESIS BI: CPT

## 2025-06-11 ENCOUNTER — RESULTS FOLLOW-UP (OUTPATIENT)
Dept: FAMILY MEDICINE CLINIC | Age: 65
End: 2025-06-11

## 2025-06-27 ENCOUNTER — OFFICE VISIT (OUTPATIENT)
Dept: FAMILY MEDICINE CLINIC | Age: 65
End: 2025-06-27

## 2025-06-27 VITALS
WEIGHT: 127.4 LBS | HEIGHT: 64 IN | SYSTOLIC BLOOD PRESSURE: 146 MMHG | OXYGEN SATURATION: 98 % | DIASTOLIC BLOOD PRESSURE: 90 MMHG | BODY MASS INDEX: 21.75 KG/M2 | HEART RATE: 75 BPM

## 2025-06-27 DIAGNOSIS — Z87.891 PERSONAL HISTORY OF TOBACCO USE: ICD-10-CM

## 2025-06-27 DIAGNOSIS — Z00.00 WELL ADULT EXAM: ICD-10-CM

## 2025-06-27 DIAGNOSIS — J44.9 CHRONIC OBSTRUCTIVE PULMONARY DISEASE, UNSPECIFIED COPD TYPE (HCC): ICD-10-CM

## 2025-06-27 DIAGNOSIS — R73.03 PREDIABETES: ICD-10-CM

## 2025-06-27 DIAGNOSIS — E78.2 MIXED HYPERLIPIDEMIA: ICD-10-CM

## 2025-06-27 DIAGNOSIS — N95.9 POST MENOPAUSAL PROBLEMS: ICD-10-CM

## 2025-06-27 DIAGNOSIS — Z23 NEED FOR VACCINATION: ICD-10-CM

## 2025-06-27 DIAGNOSIS — K76.0 FATTY LIVER: ICD-10-CM

## 2025-06-27 DIAGNOSIS — I10 PRIMARY HYPERTENSION: Primary | ICD-10-CM

## 2025-06-27 SDOH — ECONOMIC STABILITY: FOOD INSECURITY: WITHIN THE PAST 12 MONTHS, THE FOOD YOU BOUGHT JUST DIDN'T LAST AND YOU DIDN'T HAVE MONEY TO GET MORE.: NEVER TRUE

## 2025-06-27 SDOH — ECONOMIC STABILITY: FOOD INSECURITY: WITHIN THE PAST 12 MONTHS, YOU WORRIED THAT YOUR FOOD WOULD RUN OUT BEFORE YOU GOT MONEY TO BUY MORE.: NEVER TRUE

## 2025-06-27 ASSESSMENT — PATIENT HEALTH QUESTIONNAIRE - PHQ9
2. FEELING DOWN, DEPRESSED OR HOPELESS: NOT AT ALL
SUM OF ALL RESPONSES TO PHQ QUESTIONS 1-9: 0
1. LITTLE INTEREST OR PLEASURE IN DOING THINGS: NOT AT ALL
SUM OF ALL RESPONSES TO PHQ QUESTIONS 1-9: 0

## 2025-06-27 NOTE — PATIENT INSTRUCTIONS
follow-up, he or she will help you understand what to do next.  After a lung cancer screening, you can go back to your usual activities right away.  A lung cancer screening test can't tell if you have lung cancer. If your results are positive, your doctor can't tell whether an abnormal finding is a harmless nodule, cancer, or something else without doing more tests.  What can you do to help prevent lung cancer?  Some lung cancers can't be prevented. But if you smoke, quitting smoking is the best step you can take to prevent lung cancer. If you want to quit, your doctor can recommend medicines or other ways to help.  Follow-up care is a key part of your treatment and safety. Be sure to make and go to all appointments, and call your doctor if you are having problems. It's also a good idea to know your test results and keep a list of the medicines you take.  Where can you learn more?  Go to https://www.[employer].net/patientEd and enter Q940 to learn more about \"Learning About Lung Cancer Screening.\"  Current as of: October 25, 2024  Content Version: 14.5  © 2501-7021 [employer].   Care instructions adapted under license by [employer]. If you have questions about a medical condition or this instruction, always ask your healthcare professional. [employer] disclaims any warranty or liability for your use of this information.

## 2025-06-27 NOTE — PROGRESS NOTES
[facility]  [facility]  [address]  [address]  [facility] [address]  Dept: [phone]      Millie Ortiz is a 64 y.o. female who presents today for follow up on her  medical conditions as noted below.      Chief Complaint   Patient presents with    Lump on upper chest area        Patient Active Problem List:     History of abnormal Pap smear     Primary hypertension     Other hyperlipidemia     NAFLD (nonalcoholic fatty liver disease)     Chronic obstructive pulmonary disease (HCC)     At high risk for breast cancer     Personal history of tobacco use     Stenosis of cervical spine with myelopathy (HCC)     Past Medical History:   Diagnosis Date    COPD (chronic obstructive pulmonary disease) (HCC)     Depression     History of abnormal Pap smear     ASCUS (+)HPV    HLD (hyperlipidemia)     Hypertension     Liver disease     Liver mass     Stenosis of cervical spine     Under care of service provider 10/05/2022    [facility]-enrique sebastian-last visit july 2022    Under care of service provider 10/05/2022    reese carlos-last visit june 2022      Past Surgical History:   Procedure Laterality Date    BREAST REDUCTION SURGERY      CARPAL TUNNEL RELEASE Left 06/2022    [facility]    CERVICAL FUSION N/A 10/18/2022    C6 CERVICAL CORPECTOMY performed by Lashon Hurt DO at [facility] OR    COLONOSCOPY  2020    Dr Sierra    COLPOSCOPY  01/01/2005    HAND SURGERY  01/01/2013    right hand, trigger thumb    VERTEBRAL CORPECTOMY  10/18/2022    C6 CERVICAL CORPECTOMY     Family History   Problem Relation Age of Onset    Breast Cancer Mother 75    Cirrhosis Mother     Alcohol Abuse Mother     Hypertension Father     Breast Cancer Maternal Grandmother 75       Current Outpatient Medications   Medication Sig Dispense Refill    venlafaxine (EFFEXOR) 75 MG tablet Take 1 tablet by mouth daily 90 tablet 1    atorvastatin (LIPITOR) 20 MG tablet Take 1 tablet by mouth daily 90

## (undated) DEVICE — GLOVE SURG SZ 65 THK91MIL LTX FREE SYN POLYISOPRENE

## (undated) DEVICE — NEEDLE HYPO 25GA L1.5IN BLU POLYPR HUB S STL REG BVL STR

## (undated) DEVICE — C-ARM: Brand: UNBRANDED

## (undated) DEVICE — STERILE POLYISOPRENE POWDER-FREE SURGICAL GLOVES WITH EMOLLIENT COATING: Brand: PROTEXIS

## (undated) DEVICE — NEEDLE SPNL 18GA L3.5IN W/ QNCKE SHARPER BVL DURA CLICK

## (undated) DEVICE — 3M™ IOBAN™ 2 ANTIMICROBIAL INCISE DRAPE 6650EZ: Brand: IOBAN™ 2

## (undated) DEVICE — GAUZE,SPONGE,FLUFF,6"X6.75",STRL,5/TRAY: Brand: MEDLINE

## (undated) DEVICE — AGENT HEMOSTATIC SURGIFLOW MATRIX KIT W/THROMBIN

## (undated) DEVICE — GLOVE ORANGE PI 7 1/2   MSG9075

## (undated) DEVICE — SYRINGE,CONTROL,LL,FINGER,GRIP: Brand: MEDLINE INDUSTRIES, INC.

## (undated) DEVICE — INTENDED FOR TISSUE SEPARATION, AND OTHER PROCEDURES THAT REQUIRE A SHARP SURGICAL BLADE TO PUNCTURE OR CUT.: Brand: BARD-PARKER ® CARBON RIB-BACK BLADES

## (undated) DEVICE — DRAPE,THYROID,SOFT,STERILE: Brand: MEDLINE

## (undated) DEVICE — COLLAR CERV UNIV AD L13-19IN TRACH OPN TWO PC RIG POLYETH

## (undated) DEVICE — DRAPE,REIN 53X77,STERILE: Brand: MEDLINE

## (undated) DEVICE — SYRINGE MED 10ML TRNSLUC BRL PLUNG BLK MRK POLYPR CTRL

## (undated) DEVICE — GLOVE ORANGE PI 7   MSG9070

## (undated) DEVICE — 3.0MM PRECISION NEURO (MATCH HEAD)

## (undated) DEVICE — PACK PROCEDURE SURG LUMBAR SPINE SVMMC

## (undated) DEVICE — NEEDLE SPINAL 22GA L3.5IN SPINOCAN

## (undated) DEVICE — MARKER,SKIN,WI/RULER AND LABELS: Brand: MEDLINE

## (undated) DEVICE — SUTURE NONABSORBABLE MONOFILAMENT 2-0 FS 18 IN ETHILON 664H

## (undated) DEVICE — KIT DRN FLAT W/ 100CC EVAC 7MM FULL PERF

## (undated) DEVICE — DRAPE MICSCP W117XL305CM DIA65MM LENS W VARI LENS2 FOR LEICA

## (undated) DEVICE — SPONGE,PEANUT,XRAY,ST,SM,3/8",5/CARD: Brand: MEDLINE INDUSTRIES, INC.

## (undated) DEVICE — ADHESIVE SKIN CLOSURE TOP 36 CC HI VISC DERMBND MINI

## (undated) DEVICE — GARMENT,MEDLINE,DVT,INT,CALF,MED, GEN2: Brand: MEDLINE

## (undated) DEVICE — COVER,MAYO STAND,STERILE: Brand: MEDLINE

## (undated) DEVICE — ELECTRODE PT RET AD L9FT HI MOIST COND ADH HYDRGEL CORDED

## (undated) DEVICE — BLADE SURG NO15 C STL SHRP PREM

## (undated) DEVICE — SUTURE MCRYL SZ 4-0 L18IN ABSRB UD L16MM PC-3 3/8 CIR PRIM Y845G

## (undated) DEVICE — SYRINGE IRRIG 60ML SFT PLIABLE BLB EZ TO GRP 1 HND USE W/

## (undated) DEVICE — APPLICATOR MEDICATED 10.5 CC SOLUTION HI LT ORNG CHLORAPREP

## (undated) DEVICE — BLADE ES ELASTOMERIC COAT INSUL DURABLE BEND UPTO 90DEG

## (undated) DEVICE — CATHETER IV 14GA L1.25IN TEF STR HUB INTROCAN SFTY

## (undated) DEVICE — SPONGE,NEURO,0.5"X0.5",XR,STRL,10/PK: Brand: MEDLINE

## (undated) DEVICE — Device

## (undated) DEVICE — GOWN,AURORA,NONREINFORCED,LARGE: Brand: MEDLINE

## (undated) DEVICE — CONNECTOR TBNG WHT PLAS SUCT STR 5IN1 LTWT W/ M CONN

## (undated) DEVICE — CODMAN® SURGICAL PATTIES 1/2" X 3" (1.27CM X 7.62CM): Brand: CODMAN®